# Patient Record
Sex: FEMALE | Race: OTHER | HISPANIC OR LATINO
[De-identification: names, ages, dates, MRNs, and addresses within clinical notes are randomized per-mention and may not be internally consistent; named-entity substitution may affect disease eponyms.]

---

## 2018-12-06 ENCOUNTER — APPOINTMENT (OUTPATIENT)
Dept: OBGYN | Facility: CLINIC | Age: 28
End: 2018-12-06
Payer: COMMERCIAL

## 2018-12-06 ENCOUNTER — ASOB RESULT (OUTPATIENT)
Age: 28
End: 2018-12-06

## 2018-12-06 PROCEDURE — 76830 TRANSVAGINAL US NON-OB: CPT

## 2019-10-02 ENCOUNTER — APPOINTMENT (OUTPATIENT)
Dept: HUMAN REPRODUCTION | Facility: CLINIC | Age: 29
End: 2019-10-02
Payer: COMMERCIAL

## 2019-10-02 PROCEDURE — 76830 TRANSVAGINAL US NON-OB: CPT

## 2019-10-02 PROCEDURE — 99205 OFFICE O/P NEW HI 60 MIN: CPT | Mod: 25

## 2019-10-09 ENCOUNTER — APPOINTMENT (OUTPATIENT)
Dept: HUMAN REPRODUCTION | Facility: CLINIC | Age: 29
End: 2019-10-09
Payer: COMMERCIAL

## 2019-10-09 PROCEDURE — 99213 OFFICE O/P EST LOW 20 MIN: CPT | Mod: 25

## 2019-10-09 PROCEDURE — 76830 TRANSVAGINAL US NON-OB: CPT

## 2019-10-16 ENCOUNTER — APPOINTMENT (OUTPATIENT)
Dept: HUMAN REPRODUCTION | Facility: CLINIC | Age: 29
End: 2019-10-16
Payer: COMMERCIAL

## 2019-10-16 PROCEDURE — 99213 OFFICE O/P EST LOW 20 MIN: CPT | Mod: 25

## 2019-10-16 PROCEDURE — 76830 TRANSVAGINAL US NON-OB: CPT

## 2019-10-24 ENCOUNTER — APPOINTMENT (OUTPATIENT)
Dept: HUMAN REPRODUCTION | Facility: CLINIC | Age: 29
End: 2019-10-24
Payer: COMMERCIAL

## 2019-10-24 PROCEDURE — 76830 TRANSVAGINAL US NON-OB: CPT

## 2019-10-24 PROCEDURE — 99213 OFFICE O/P EST LOW 20 MIN: CPT | Mod: 25

## 2019-10-24 PROCEDURE — 36415 COLL VENOUS BLD VENIPUNCTURE: CPT

## 2019-11-12 ENCOUNTER — APPOINTMENT (OUTPATIENT)
Dept: HUMAN REPRODUCTION | Facility: CLINIC | Age: 29
End: 2019-11-12
Payer: COMMERCIAL

## 2019-11-12 PROCEDURE — 99214 OFFICE O/P EST MOD 30 MIN: CPT

## 2019-11-20 ENCOUNTER — APPOINTMENT (OUTPATIENT)
Dept: HUMAN REPRODUCTION | Facility: CLINIC | Age: 29
End: 2019-11-20
Payer: COMMERCIAL

## 2019-11-20 PROCEDURE — 99213 OFFICE O/P EST LOW 20 MIN: CPT

## 2019-11-20 PROCEDURE — 76830 TRANSVAGINAL US NON-OB: CPT

## 2019-12-17 ENCOUNTER — APPOINTMENT (OUTPATIENT)
Dept: HUMAN REPRODUCTION | Facility: CLINIC | Age: 29
End: 2019-12-17

## 2020-07-16 ENCOUNTER — RESULT REVIEW (OUTPATIENT)
Age: 30
End: 2020-07-16

## 2020-11-12 ENCOUNTER — ASOB RESULT (OUTPATIENT)
Age: 30
End: 2020-11-12

## 2020-11-12 ENCOUNTER — APPOINTMENT (OUTPATIENT)
Dept: OBGYN | Facility: CLINIC | Age: 30
End: 2020-11-12
Payer: COMMERCIAL

## 2020-11-12 PROCEDURE — 76817 TRANSVAGINAL US OBSTETRIC: CPT

## 2020-11-16 ENCOUNTER — APPOINTMENT (OUTPATIENT)
Dept: DISASTER EMERGENCY | Facility: CLINIC | Age: 30
End: 2020-11-16

## 2020-11-17 ENCOUNTER — OUTPATIENT (OUTPATIENT)
Dept: OUTPATIENT SERVICES | Facility: HOSPITAL | Age: 30
LOS: 1 days | End: 2020-11-17

## 2020-11-17 VITALS
OXYGEN SATURATION: 98 % | SYSTOLIC BLOOD PRESSURE: 100 MMHG | HEART RATE: 92 BPM | RESPIRATION RATE: 16 BRPM | WEIGHT: 147.93 LBS | DIASTOLIC BLOOD PRESSURE: 60 MMHG | TEMPERATURE: 97 F | HEIGHT: 60 IN

## 2020-11-17 DIAGNOSIS — Z98.891 HISTORY OF UTERINE SCAR FROM PREVIOUS SURGERY: Chronic | ICD-10-CM

## 2020-11-17 DIAGNOSIS — O02.1 MISSED ABORTION: ICD-10-CM

## 2020-11-17 DIAGNOSIS — Z88.0 ALLERGY STATUS TO PENICILLIN: ICD-10-CM

## 2020-11-17 DIAGNOSIS — Z98.890 OTHER SPECIFIED POSTPROCEDURAL STATES: Chronic | ICD-10-CM

## 2020-11-17 LAB
BLD GP AB SCN SERPL QL: NEGATIVE — SIGNIFICANT CHANGE UP
HCT VFR BLD CALC: 35.7 % — SIGNIFICANT CHANGE UP (ref 34.5–45)
HGB BLD-MCNC: 11.9 G/DL — SIGNIFICANT CHANGE UP (ref 11.5–15.5)
MCHC RBC-ENTMCNC: 30.7 PG — SIGNIFICANT CHANGE UP (ref 27–34)
MCHC RBC-ENTMCNC: 33.3 % — SIGNIFICANT CHANGE UP (ref 32–36)
MCV RBC AUTO: 92 FL — SIGNIFICANT CHANGE UP (ref 80–100)
NRBC # FLD: 0 K/UL — SIGNIFICANT CHANGE UP (ref 0–0)
PLATELET # BLD AUTO: 402 K/UL — HIGH (ref 150–400)
PMV BLD: 10.1 FL — SIGNIFICANT CHANGE UP (ref 7–13)
RBC # BLD: 3.88 M/UL — SIGNIFICANT CHANGE UP (ref 3.8–5.2)
RBC # FLD: 12.2 % — SIGNIFICANT CHANGE UP (ref 10.3–14.5)
RH IG SCN BLD-IMP: POSITIVE — SIGNIFICANT CHANGE UP
SARS-COV-2 N GENE NPH QL NAA+PROBE: NOT DETECTED
WBC # BLD: 12.88 K/UL — HIGH (ref 3.8–10.5)
WBC # FLD AUTO: 12.88 K/UL — HIGH (ref 3.8–10.5)

## 2020-11-17 NOTE — H&P PST ADULT - NSICDXPROBLEM_GEN_ALL_CORE_FT
PROBLEM DIAGNOSES  Problem: Missed   Assessment and Plan: scheduled for DVC on 20  pending labs   preop instructions given & explained, pt verbalized understanding  COVID test scheduled by surgeon's office    Problem: Penicillin allergy  Assessment and Plan: OR booking notified via fax

## 2020-11-17 NOTE — H&P PST ADULT - NEGATIVE ENMT SYMPTOMS
no vertigo/no sinus symptoms/no tinnitus/no post-nasal discharge/no nose bleeds/no ear pain/no hearing difficulty

## 2020-11-17 NOTE — H&P PST ADULT - HISTORY OF PRESENT ILLNESS
29 y/o female 6 weeks IUP, presents for preop evaluation for DVC for Missed AB.   Pt stated she went for fetal heart monitoring & no FHR heard.

## 2020-11-17 NOTE — H&P PST ADULT - NSICDXPASTMEDICALHX_GEN_ALL_CORE_FT
PAST MEDICAL HISTORY:  Anemia B Twelve Deficiency     h/o Gall Bladder Disease     Menstrual Disorder      PAST MEDICAL HISTORY:  Anemia B Twelve Deficiency     h/o Gall Bladder Disease     HELLP syndrome 2016    Menstrual Disorder

## 2020-11-17 NOTE — H&P PST ADULT - NEGATIVE CARDIOVASCULAR SYMPTOMS
no palpitations/no orthopnea/no claudication/no peripheral edema/no dyspnea on exertion/no chest pain

## 2020-11-17 NOTE — H&P PST ADULT - NSICDXPASTSURGICALHX_GEN_ALL_CORE_FT
PAST SURGICAL HISTORY:  h/o cholecystectomy  laproscopic silvia    History of  2016    History of D&C 2009

## 2020-11-17 NOTE — H&P PST ADULT - VENOUS THROMBOEMBOLISM AGE
[facility] Emergency Department  [address] [place] [address]  Phone:  [phone]  Fax:  [phone]                                    Isaura Gray   MRN: [id number]    Department:  [facility] Emergency Department   Date of Visit:  3/12/2019           After Visit Summary Signature Page    I have received my discharge instructions, and my questions have been answered. I have discussed any challenges I see with this plan with the nurse or doctor.    ..........................................................................................................................................  Patient/Patient Representative Signature      ..........................................................................................................................................  Patient Representative Print Name and Relationship to Patient    ..................................................               ................................................  Date                                   Time    ..........................................................................................................................................  Reviewed by Signature/Title    ...................................................              ..............................................  Date                                               Time          22EPIC Rev 08/18       
(0) age 40 years or less

## 2020-11-17 NOTE — H&P PST ADULT - NEGATIVE GENERAL GENITOURINARY SYMPTOMS
no hematuria/no flank pain R/no bladder infections/no urinary hesitancy/no renal colic/normal urinary frequency/no flank pain L

## 2020-11-18 ENCOUNTER — TRANSCRIPTION ENCOUNTER (OUTPATIENT)
Age: 30
End: 2020-11-18

## 2020-11-18 NOTE — ASU PATIENT PROFILE, ADULT - PMH
Anemia B Twelve Deficiency    h/o Gall Bladder Disease    HELLP syndrome  2016  Menstrual Disorder

## 2020-11-19 ENCOUNTER — RESULT REVIEW (OUTPATIENT)
Age: 30
End: 2020-11-19

## 2020-11-19 ENCOUNTER — OUTPATIENT (OUTPATIENT)
Dept: OUTPATIENT SERVICES | Facility: HOSPITAL | Age: 30
LOS: 1 days | Discharge: HOME | End: 2020-11-19
Payer: COMMERCIAL

## 2020-11-19 ENCOUNTER — OUTPATIENT (OUTPATIENT)
Dept: OUTPATIENT SERVICES | Facility: HOSPITAL | Age: 30
LOS: 1 days | End: 2020-11-19
Payer: COMMERCIAL

## 2020-11-19 VITALS
OXYGEN SATURATION: 99 % | RESPIRATION RATE: 18 BRPM | SYSTOLIC BLOOD PRESSURE: 106 MMHG | HEART RATE: 88 BPM | DIASTOLIC BLOOD PRESSURE: 66 MMHG

## 2020-11-19 VITALS
HEART RATE: 91 BPM | RESPIRATION RATE: 16 BRPM | SYSTOLIC BLOOD PRESSURE: 118 MMHG | TEMPERATURE: 99 F | DIASTOLIC BLOOD PRESSURE: 80 MMHG | OXYGEN SATURATION: 100 %

## 2020-11-19 DIAGNOSIS — Z98.890 OTHER SPECIFIED POSTPROCEDURAL STATES: Chronic | ICD-10-CM

## 2020-11-19 DIAGNOSIS — O02.1 MISSED ABORTION: ICD-10-CM

## 2020-11-19 DIAGNOSIS — Z98.891 HISTORY OF UTERINE SCAR FROM PREVIOUS SURGERY: Chronic | ICD-10-CM

## 2020-11-19 PROBLEM — O14.20 HELLP SYNDROME (HELLP), UNSPECIFIED TRIMESTER: Chronic | Status: ACTIVE | Noted: 2020-11-17

## 2020-11-19 PROCEDURE — 88305 TISSUE EXAM BY PATHOLOGIST: CPT | Mod: 26

## 2020-11-19 PROCEDURE — 88233 TISSUE CULTURE SKIN/BIOPSY: CPT

## 2020-11-19 PROCEDURE — 88280 CHROMOSOME KARYOTYPE STUDY: CPT

## 2020-11-19 PROCEDURE — 81229 CYTOG ALYS CHRML ABNR SNPCGH: CPT

## 2020-11-19 PROCEDURE — 88264 CHROMOSOME ANALYSIS 20-25: CPT

## 2020-11-19 RX ORDER — SODIUM CHLORIDE 9 MG/ML
1000 INJECTION, SOLUTION INTRAVENOUS
Refills: 0 | Status: DISCONTINUED | OUTPATIENT
Start: 2020-11-19 | End: 2020-12-03

## 2020-11-19 NOTE — ASU DISCHARGE PLAN (ADULT/PEDIATRIC) - ASU DC SPECIAL INSTRUCTIONSFT
Please make an appointment to see your doctor as ordered.   Nothing per vagina - no douching, no tampons, no sex - for 2 weeks.   Please call your doctor if you develop: fevers, nausea or vomiting, or have pain not relieved by motrin and/or tylenol.

## 2020-11-19 NOTE — ASU DISCHARGE PLAN (ADULT/PEDIATRIC) - CARE PROVIDER_API CALL
Chilango Dove)  Obstetrics and Gynecology  [address] [id number]  Phone: [phone]  Fax: [phone]  Follow Up Time:

## 2020-11-19 NOTE — ASU DISCHARGE PLAN (ADULT/PEDIATRIC) - ACTIVITY LEVEL
No tampons/No tub baths/Nothing per rectum/Elevate extremity/Nothing per vagina/No douching/No intercourse

## 2020-11-24 LAB — SURGICAL PATHOLOGY STUDY: SIGNIFICANT CHANGE UP

## 2020-12-03 LAB — CHROM ANALY OVERALL INTERP SPEC-IMP: SIGNIFICANT CHANGE UP

## 2021-02-12 LAB — MICROARRAY DELETION: SIGNIFICANT CHANGE UP

## 2021-04-26 ENCOUNTER — ASOB RESULT (OUTPATIENT)
Age: 31
End: 2021-04-26

## 2021-04-26 ENCOUNTER — APPOINTMENT (OUTPATIENT)
Dept: OBGYN | Facility: CLINIC | Age: 31
End: 2021-04-26
Payer: COMMERCIAL

## 2021-04-26 ENCOUNTER — APPOINTMENT (OUTPATIENT)
Dept: OBGYN | Facility: CLINIC | Age: 31
End: 2021-04-26

## 2021-04-26 PROCEDURE — 99072 ADDL SUPL MATRL&STAF TM PHE: CPT

## 2021-04-26 PROCEDURE — 76817 TRANSVAGINAL US OBSTETRIC: CPT

## 2021-04-30 NOTE — H&P PST ADULT - NEGATIVE NEUROLOGICAL SYMPTOMS
no focal seizures/no vertigo/no transient paralysis/no weakness/no paresthesias/no generalized seizures/no headache/no syncope/no tremors 108.5

## 2021-05-11 ENCOUNTER — EMERGENCY (EMERGENCY)
Facility: HOSPITAL | Age: 31
LOS: 1 days | Discharge: HOME | End: 2021-05-11
Attending: EMERGENCY MEDICINE | Admitting: EMERGENCY MEDICINE
Payer: COMMERCIAL

## 2021-05-11 VITALS
HEIGHT: 60 IN | SYSTOLIC BLOOD PRESSURE: 121 MMHG | TEMPERATURE: 98 F | RESPIRATION RATE: 16 BRPM | DIASTOLIC BLOOD PRESSURE: 69 MMHG | OXYGEN SATURATION: 100 % | HEART RATE: 89 BPM

## 2021-05-11 DIAGNOSIS — Z98.891 HISTORY OF UTERINE SCAR FROM PREVIOUS SURGERY: Chronic | ICD-10-CM

## 2021-05-11 DIAGNOSIS — Z98.890 OTHER SPECIFIED POSTPROCEDURAL STATES: Chronic | ICD-10-CM

## 2021-05-11 LAB
ALBUMIN SERPL ELPH-MCNC: 4.1 G/DL — SIGNIFICANT CHANGE UP (ref 3.3–5)
ALP SERPL-CCNC: 84 U/L — SIGNIFICANT CHANGE UP (ref 40–120)
ALT FLD-CCNC: 18 U/L — SIGNIFICANT CHANGE UP (ref 4–33)
ANION GAP SERPL CALC-SCNC: 13 MMOL/L — SIGNIFICANT CHANGE UP (ref 7–14)
APPEARANCE UR: CLEAR — SIGNIFICANT CHANGE UP
AST SERPL-CCNC: 17 U/L — SIGNIFICANT CHANGE UP (ref 4–32)
BACTERIA # UR AUTO: ABNORMAL
BASOPHILS # BLD AUTO: 0.06 K/UL — SIGNIFICANT CHANGE UP (ref 0–0.2)
BASOPHILS NFR BLD AUTO: 0.5 % — SIGNIFICANT CHANGE UP (ref 0–2)
BILIRUB SERPL-MCNC: 0.3 MG/DL — SIGNIFICANT CHANGE UP (ref 0.2–1.2)
BILIRUB UR-MCNC: NEGATIVE — SIGNIFICANT CHANGE UP
BLD GP AB SCN SERPL QL: NEGATIVE — SIGNIFICANT CHANGE UP
BUN SERPL-MCNC: 9 MG/DL — SIGNIFICANT CHANGE UP (ref 7–23)
CALCIUM SERPL-MCNC: 9.2 MG/DL — SIGNIFICANT CHANGE UP (ref 8.4–10.5)
CHLORIDE SERPL-SCNC: 101 MMOL/L — SIGNIFICANT CHANGE UP (ref 98–107)
CO2 SERPL-SCNC: 20 MMOL/L — LOW (ref 22–31)
COLOR SPEC: YELLOW — SIGNIFICANT CHANGE UP
CREAT SERPL-MCNC: 0.53 MG/DL — SIGNIFICANT CHANGE UP (ref 0.5–1.3)
DIFF PNL FLD: ABNORMAL
EOSINOPHIL # BLD AUTO: 0.08 K/UL — SIGNIFICANT CHANGE UP (ref 0–0.5)
EOSINOPHIL NFR BLD AUTO: 0.7 % — SIGNIFICANT CHANGE UP (ref 0–6)
EPI CELLS # UR: 2 /HPF — SIGNIFICANT CHANGE UP (ref 0–5)
GLUCOSE SERPL-MCNC: 83 MG/DL — SIGNIFICANT CHANGE UP (ref 70–99)
GLUCOSE UR QL: NEGATIVE — SIGNIFICANT CHANGE UP
HCG SERPL-ACNC: SIGNIFICANT CHANGE UP MIU/ML
HCT VFR BLD CALC: 36.8 % — SIGNIFICANT CHANGE UP (ref 34.5–45)
HGB BLD-MCNC: 12.2 G/DL — SIGNIFICANT CHANGE UP (ref 11.5–15.5)
IANC: 7.89 K/UL — SIGNIFICANT CHANGE UP (ref 1.5–8.5)
IMM GRANULOCYTES NFR BLD AUTO: 0.7 % — SIGNIFICANT CHANGE UP (ref 0–1.5)
KETONES UR-MCNC: NEGATIVE — SIGNIFICANT CHANGE UP
LEUKOCYTE ESTERASE UR-ACNC: NEGATIVE — SIGNIFICANT CHANGE UP
LYMPHOCYTES # BLD AUTO: 2.84 K/UL — SIGNIFICANT CHANGE UP (ref 1–3.3)
LYMPHOCYTES # BLD AUTO: 24.7 % — SIGNIFICANT CHANGE UP (ref 13–44)
MCHC RBC-ENTMCNC: 30 PG — SIGNIFICANT CHANGE UP (ref 27–34)
MCHC RBC-ENTMCNC: 33.2 GM/DL — SIGNIFICANT CHANGE UP (ref 32–36)
MCV RBC AUTO: 90.4 FL — SIGNIFICANT CHANGE UP (ref 80–100)
MONOCYTES # BLD AUTO: 0.55 K/UL — SIGNIFICANT CHANGE UP (ref 0–0.9)
MONOCYTES NFR BLD AUTO: 4.8 % — SIGNIFICANT CHANGE UP (ref 2–14)
NEUTROPHILS # BLD AUTO: 7.89 K/UL — HIGH (ref 1.8–7.4)
NEUTROPHILS NFR BLD AUTO: 68.6 % — SIGNIFICANT CHANGE UP (ref 43–77)
NITRITE UR-MCNC: NEGATIVE — SIGNIFICANT CHANGE UP
NRBC # BLD: 0 /100 WBCS — SIGNIFICANT CHANGE UP
NRBC # FLD: 0 K/UL — SIGNIFICANT CHANGE UP
PH UR: 7 — SIGNIFICANT CHANGE UP (ref 5–8)
PLATELET # BLD AUTO: 384 K/UL — SIGNIFICANT CHANGE UP (ref 150–400)
POTASSIUM SERPL-MCNC: 4.1 MMOL/L — SIGNIFICANT CHANGE UP (ref 3.5–5.3)
POTASSIUM SERPL-SCNC: 4.1 MMOL/L — SIGNIFICANT CHANGE UP (ref 3.5–5.3)
PROT SERPL-MCNC: 7.8 G/DL — SIGNIFICANT CHANGE UP (ref 6–8.3)
PROT UR-MCNC: ABNORMAL
RBC # BLD: 4.07 M/UL — SIGNIFICANT CHANGE UP (ref 3.8–5.2)
RBC # FLD: 12.3 % — SIGNIFICANT CHANGE UP (ref 10.3–14.5)
RBC CASTS # UR COMP ASSIST: 1 /HPF — SIGNIFICANT CHANGE UP (ref 0–4)
RH IG SCN BLD-IMP: POSITIVE — SIGNIFICANT CHANGE UP
SODIUM SERPL-SCNC: 134 MMOL/L — LOW (ref 135–145)
SP GR SPEC: 1.02 — SIGNIFICANT CHANGE UP (ref 1.01–1.02)
UROBILINOGEN FLD QL: SIGNIFICANT CHANGE UP
WBC # BLD: 11.5 K/UL — HIGH (ref 3.8–10.5)
WBC # FLD AUTO: 11.5 K/UL — HIGH (ref 3.8–10.5)
WBC UR QL: 2 /HPF — SIGNIFICANT CHANGE UP (ref 0–5)

## 2021-05-11 PROCEDURE — 76801 OB US < 14 WKS SINGLE FETUS: CPT | Mod: 26

## 2021-05-11 PROCEDURE — 99285 EMERGENCY DEPT VISIT HI MDM: CPT

## 2021-05-11 RX ORDER — SODIUM CHLORIDE 9 MG/ML
1000 INJECTION INTRAMUSCULAR; INTRAVENOUS; SUBCUTANEOUS ONCE
Refills: 0 | Status: COMPLETED | OUTPATIENT
Start: 2021-05-11 | End: 2021-05-11

## 2021-05-11 RX ORDER — ONDANSETRON 8 MG/1
1 TABLET, FILM COATED ORAL
Qty: 30 | Refills: 0
Start: 2021-05-11 | End: 2021-05-20

## 2021-05-11 RX ADMIN — SODIUM CHLORIDE 1000 MILLILITER(S): 9 INJECTION INTRAMUSCULAR; INTRAVENOUS; SUBCUTANEOUS at 09:31

## 2021-05-11 NOTE — ED ADULT TRIAGE NOTE - CHIEF COMPLAINT QUOTE
pt , 7 weeks pregnant c/o intermittent vaginal bleeding x 5 days, with worsening yesterday. pt endorses L sided pelvic cramping, fatigue, nausea. pt denies dysuria, fever, chest pain, son. Hx of HELLP Syndrome in 2016.

## 2021-05-11 NOTE — ED PROVIDER NOTE - ATTENDING CONTRIBUTION TO CARE
31 yo F , currently 7 weeks pregnancy, confirmed IUP last Thursday w/ OB/GYN Dr. Dove, with PMH of HELLP syndrome 2016, missed  s/p D&C 2020, fibroid presents to ER c/o vaginal bleeding since last Friday a/w left pelvic pain. Will obtain labs, TVUS to assess pregnancy, reassess. Abd s/nt, NAD

## 2021-05-11 NOTE — ED PROVIDER NOTE - PATIENT PORTAL LINK FT
You can access the FollowMyHealth Patient Portal offered by [facility] by registering at the following website: [email or web address]. By joining [employer]’s FollowMyHealth portal, you will also be able to view your health information using other applications (apps) compatible with our system. You can access the FollowMyHealth Patient Portal offered by [facility] by registering at the following website: [email or web address]. By joining [employer]’s FollowMyHealth portal, you will also be able to view your health information using other applications (apps) compatible with our system.

## 2021-05-11 NOTE — ED PROVIDER NOTE - ABDOMINAL EXAM
mild left suprapubic discomfort, no CVAT b/l, no guarding, no rebound/soft/nondistended/no pulsating masses

## 2021-05-11 NOTE — ED PROVIDER NOTE - PROGRESS NOTE DETAILS
PA ELSA: Patient reassessed, sitting comfortably in chair in NAD, denies any complaints. States feeling better, symptoms improved. labs reviewed. TVUS shows single live IUP, small subchorionic hematoma. Discussed with attending, patient can be discharged home to f/u with GYN. The patient was given verbal and written discharge instructions. Specifically, instructions when to return to the ED and when to seek follow-up from their pcp was discussed. Any specialty follow-up was discussed, including how to make an appointment.  Instructions were discussed in simple, plain language and was understood by the patient. The patient understands that should their symptoms worsen or any new symptoms arise, they should return to the ED immediately for further evaluation. All pt's questions were answered. Patient verbalizes understanding.

## 2021-05-11 NOTE — ED PROVIDER NOTE - OBJECTIVE STATEMENT
31 yo F , currently 7 weeks pregnancy, confirmed IUP last Thursday w/ GYN, presents to ER c/o vaginal bleeding since last Friday a/w left pelvic pain. 29 yo F , currently 7 weeks pregnancy, confirmed IUP last Thursday w/ OB/GYN Dr. Dove, with PMH of HELLP syndrome 2016, missed  s/p D&C 2020, fibroid presents to ER c/o vaginal bleeding since last Friday a/w left pelvic pain. Pt states she was having brown vaginal spotting started on Friday and bright red vaginal spotting yesterday a/w constant left pelvic pain. Reports seen by GYN last Friday, started on Aspirin and progesterone. Admits nausea, nonbloody diarrhea since Friday. Admits urinary frequency since pregnant, no burning on urination. Denies any fever, chills, vomiting, headache, dizziness, chest pain, sob, back pain, weakness, numbness, leg swelling, recent travel, or any other complaints.

## 2021-05-11 NOTE — ED PROVIDER NOTE - CLINICAL SUMMARY MEDICAL DECISION MAKING FREE TEXT BOX
31 yo F , currently 7 weeks pregnancy, confirmed IUP last Thursday w/ GYN, presents to ER c/o vaginal bleeding since last Friday a/w left pelvic pain. DDX: miscarriage, threatened . Plan: labs, Ua, TVUS to eval pregnancy, pain control w/ warm compress, and reassess. 31 yo F , currently 7 weeks pregnancy, confirmed IUP last Thursday w/ GYN, presents to ER c/o vaginal bleeding since last Friday a/w left pelvic pain. DDX: miscarriage, threatened , subchorionic hematoma. Plan: labs, Ua, TVUS to eval pregnancy, pain control w/ warm compress, pt declined pain med and reassess.

## 2021-05-11 NOTE — ED PROVIDER NOTE - NSFOLLOWUPINSTRUCTIONS_ED_ALL_ED_FT
Follow up with your PMD within 48-72 hrs. Follow up with your OBGYN within 1-2 days or call our clinic  [phone]. Rest, increase your fluids. No heavy lifting. Refrain from sexual intercourse.  Worsening pain, vaginal bleeding, vomiting, dizziness, weakness or ANY NEW CONCERNING SYMPTOMS return to the emergency department. Follow up with your PMD within 48-72 hrs. Follow up with your OBGYN within 1-2 days or call our clinic  [phone]. Take Zofran 4 mg ODT every 8 hours as needed for nausea and vomiting.  Rest, increase your fluids. No heavy lifting. Refrain from sexual intercourse.  Worsening pain, vaginal bleeding, vomiting, dizziness, weakness or ANY NEW CONCERNING SYMPTOMS return to the emergency department.

## 2021-05-12 LAB
CULTURE RESULTS: SIGNIFICANT CHANGE UP
SPECIMEN SOURCE: SIGNIFICANT CHANGE UP

## 2021-06-10 ENCOUNTER — APPOINTMENT (OUTPATIENT)
Dept: ANTEPARTUM | Facility: CLINIC | Age: 31
End: 2021-06-10
Payer: COMMERCIAL

## 2021-06-10 PROCEDURE — 76813 OB US NUCHAL MEAS 1 GEST: CPT

## 2021-06-12 ENCOUNTER — EMERGENCY (EMERGENCY)
Facility: HOSPITAL | Age: 31
LOS: 1 days | Discharge: HOME | End: 2021-06-12
Attending: EMERGENCY MEDICINE | Admitting: EMERGENCY MEDICINE
Payer: COMMERCIAL

## 2021-06-12 VITALS
HEIGHT: 60 IN | OXYGEN SATURATION: 100 % | SYSTOLIC BLOOD PRESSURE: 133 MMHG | TEMPERATURE: 98 F | HEART RATE: 83 BPM | DIASTOLIC BLOOD PRESSURE: 92 MMHG | RESPIRATION RATE: 15 BRPM

## 2021-06-12 DIAGNOSIS — Z98.891 HISTORY OF UTERINE SCAR FROM PREVIOUS SURGERY: Chronic | ICD-10-CM

## 2021-06-12 DIAGNOSIS — Z98.890 OTHER SPECIFIED POSTPROCEDURAL STATES: Chronic | ICD-10-CM

## 2021-06-12 LAB
ALBUMIN SERPL ELPH-MCNC: 3.7 G/DL — SIGNIFICANT CHANGE UP (ref 3.3–5)
ALP SERPL-CCNC: 78 U/L — SIGNIFICANT CHANGE UP (ref 40–120)
ALT FLD-CCNC: 11 U/L — SIGNIFICANT CHANGE UP (ref 4–33)
ANION GAP SERPL CALC-SCNC: 13 MMOL/L — SIGNIFICANT CHANGE UP (ref 7–14)
APPEARANCE UR: CLEAR — SIGNIFICANT CHANGE UP
AST SERPL-CCNC: 10 U/L — SIGNIFICANT CHANGE UP (ref 4–32)
BACTERIA # UR AUTO: NEGATIVE — SIGNIFICANT CHANGE UP
BASOPHILS # BLD AUTO: 0.05 K/UL — SIGNIFICANT CHANGE UP (ref 0–0.2)
BASOPHILS NFR BLD AUTO: 0.3 % — SIGNIFICANT CHANGE UP (ref 0–2)
BILIRUB SERPL-MCNC: <0.2 MG/DL — SIGNIFICANT CHANGE UP (ref 0.2–1.2)
BILIRUB UR-MCNC: NEGATIVE — SIGNIFICANT CHANGE UP
BLD GP AB SCN SERPL QL: NEGATIVE — SIGNIFICANT CHANGE UP
BUN SERPL-MCNC: 8 MG/DL — SIGNIFICANT CHANGE UP (ref 7–23)
CALCIUM SERPL-MCNC: 9.2 MG/DL — SIGNIFICANT CHANGE UP (ref 8.4–10.5)
CHLORIDE SERPL-SCNC: 100 MMOL/L — SIGNIFICANT CHANGE UP (ref 98–107)
CO2 SERPL-SCNC: 21 MMOL/L — LOW (ref 22–31)
COLOR SPEC: COLORLESS — SIGNIFICANT CHANGE UP
CREAT SERPL-MCNC: 0.47 MG/DL — LOW (ref 0.5–1.3)
DIFF PNL FLD: ABNORMAL
EOSINOPHIL # BLD AUTO: 0.15 K/UL — SIGNIFICANT CHANGE UP (ref 0–0.5)
EOSINOPHIL NFR BLD AUTO: 1 % — SIGNIFICANT CHANGE UP (ref 0–6)
EPI CELLS # UR: 3 /HPF — SIGNIFICANT CHANGE UP (ref 0–5)
GLUCOSE SERPL-MCNC: 93 MG/DL — SIGNIFICANT CHANGE UP (ref 70–99)
GLUCOSE UR QL: NEGATIVE — SIGNIFICANT CHANGE UP
HCG SERPL-ACNC: SIGNIFICANT CHANGE UP MIU/ML
HCT VFR BLD CALC: 34.4 % — LOW (ref 34.5–45)
HGB BLD-MCNC: 11.6 G/DL — SIGNIFICANT CHANGE UP (ref 11.5–15.5)
HYALINE CASTS # UR AUTO: 2 /LPF — SIGNIFICANT CHANGE UP (ref 0–7)
IANC: 10.28 K/UL — HIGH (ref 1.5–8.5)
IMM GRANULOCYTES NFR BLD AUTO: 0.5 % — SIGNIFICANT CHANGE UP (ref 0–1.5)
KETONES UR-MCNC: NEGATIVE — SIGNIFICANT CHANGE UP
LEUKOCYTE ESTERASE UR-ACNC: NEGATIVE — SIGNIFICANT CHANGE UP
LYMPHOCYTES # BLD AUTO: 24.7 % — SIGNIFICANT CHANGE UP (ref 13–44)
LYMPHOCYTES # BLD AUTO: 3.75 K/UL — HIGH (ref 1–3.3)
MCHC RBC-ENTMCNC: 30.2 PG — SIGNIFICANT CHANGE UP (ref 27–34)
MCHC RBC-ENTMCNC: 33.7 GM/DL — SIGNIFICANT CHANGE UP (ref 32–36)
MCV RBC AUTO: 89.6 FL — SIGNIFICANT CHANGE UP (ref 80–100)
MONOCYTES # BLD AUTO: 0.85 K/UL — SIGNIFICANT CHANGE UP (ref 0–0.9)
MONOCYTES NFR BLD AUTO: 5.6 % — SIGNIFICANT CHANGE UP (ref 2–14)
NEUTROPHILS # BLD AUTO: 10.28 K/UL — HIGH (ref 1.8–7.4)
NEUTROPHILS NFR BLD AUTO: 67.9 % — SIGNIFICANT CHANGE UP (ref 43–77)
NITRITE UR-MCNC: NEGATIVE — SIGNIFICANT CHANGE UP
NRBC # BLD: 0 /100 WBCS — SIGNIFICANT CHANGE UP
NRBC # FLD: 0 K/UL — SIGNIFICANT CHANGE UP
PH UR: 7 — SIGNIFICANT CHANGE UP (ref 5–8)
PLATELET # BLD AUTO: 386 K/UL — SIGNIFICANT CHANGE UP (ref 150–400)
POTASSIUM SERPL-MCNC: 4 MMOL/L — SIGNIFICANT CHANGE UP (ref 3.5–5.3)
POTASSIUM SERPL-SCNC: 4 MMOL/L — SIGNIFICANT CHANGE UP (ref 3.5–5.3)
PROT SERPL-MCNC: 7.3 G/DL — SIGNIFICANT CHANGE UP (ref 6–8.3)
PROT UR-MCNC: NEGATIVE — SIGNIFICANT CHANGE UP
RBC # BLD: 3.84 M/UL — SIGNIFICANT CHANGE UP (ref 3.8–5.2)
RBC # FLD: 12.2 % — SIGNIFICANT CHANGE UP (ref 10.3–14.5)
RBC CASTS # UR COMP ASSIST: 1 /HPF — SIGNIFICANT CHANGE UP (ref 0–4)
RH IG SCN BLD-IMP: POSITIVE — SIGNIFICANT CHANGE UP
SODIUM SERPL-SCNC: 134 MMOL/L — LOW (ref 135–145)
SP GR SPEC: 1.01 — LOW (ref 1.01–1.02)
UROBILINOGEN FLD QL: SIGNIFICANT CHANGE UP
WBC # BLD: 15.16 K/UL — HIGH (ref 3.8–10.5)
WBC # FLD AUTO: 15.16 K/UL — HIGH (ref 3.8–10.5)
WBC UR QL: 1 /HPF — SIGNIFICANT CHANGE UP (ref 0–5)

## 2021-06-12 PROCEDURE — 99284 EMERGENCY DEPT VISIT MOD MDM: CPT

## 2021-06-12 RX ORDER — HYDROXYZINE HCL 10 MG
25 TABLET ORAL ONCE
Refills: 0 | Status: COMPLETED | OUTPATIENT
Start: 2021-06-12 | End: 2021-06-12

## 2021-06-12 RX ORDER — CEFTRIAXONE 500 MG/1
1000 INJECTION, POWDER, FOR SOLUTION INTRAMUSCULAR; INTRAVENOUS ONCE
Refills: 0 | Status: COMPLETED | OUTPATIENT
Start: 2021-06-12 | End: 2021-06-12

## 2021-06-12 RX ADMIN — CEFTRIAXONE 100 MILLIGRAM(S): 500 INJECTION, POWDER, FOR SOLUTION INTRAMUSCULAR; INTRAVENOUS at 22:30

## 2021-06-12 RX ADMIN — Medication 25 MILLIGRAM(S): at 22:51

## 2021-06-12 NOTE — ED ADULT NURSE NOTE - OBJECTIVE STATEMENT
A&oX4 ambulatory self care 12 week pregnant female presenting for abdominal pain and vag bleeding x1 day. has a hx of HELLP syndrome in previous pregnancy. patient in no active distress. 20g iv placed in left ac. labs and urine drawn and sent

## 2021-06-12 NOTE — ED PROVIDER NOTE - ATTENDING CONTRIBUTION TO CARE
I performed a face-to-face evaluation of the patient and performed a history and physical examination. I agree with the history and physical examination.    , 12 wks pregnant, retroplacental hematoma found on US done 2/2 cramping abd pain. Pain worsening, today had VB. Concern for miscarriage or expanding hematoma. Check TVUS. Has dysuria (check UA, UCx).

## 2021-06-12 NOTE — ED PROVIDER NOTE - CLINICAL SUMMARY MEDICAL DECISION MAKING FREE TEXT BOX
Lance: , 12 wks pregnant, retroplacental hematoma found on US done 2/2 cramping abd pain. Pain worsening, today had VB. Concern for miscarriage or expanding hematoma. Check TVUS. Has dysuria (check UA, UCx).

## 2021-06-12 NOTE — ED ADULT TRIAGE NOTE - CHIEF COMPLAINT QUOTE
alert no acute distress 12 weeks pregnant c/o  abd cramping  and vaginal bleeding x 1  no c/o dizziness CP or SOB    hx hellp syndrome c section lap silvia  takes asa

## 2021-06-12 NOTE — ED PROVIDER NOTE - PATIENT PORTAL LINK FT
You can access the FollowMyHealth Patient Portal offered by [facility] by registering at the following website: [email or web address]. By joining [email or web address]’s FollowMyHealth portal, you will also be able to view your health information using other applications (apps) compatible with our system.

## 2021-06-12 NOTE — ED PROVIDER NOTE - PROGRESS NOTE DETAILS
FRANCE Wright: Pt states she has diarrhea after taking PCN as her only reaction. Pt developed a rash after taking Ceftriaxone. States Benadryl makes her chest burn, will give Hydroxyzine. No respiratory distress, pt is otherwise stable. FRANCE Peralta: Discussed results with pt, will f/u with her ob/gyn. Pt Rx macrobid for suspected uti. Pt given return precautions

## 2021-06-12 NOTE — ED PROVIDER NOTE - NSFOLLOWUPINSTRUCTIONS_ED_ALL_ED_FT
A threatened miscarriage is the term for vaginal bleeding during your first 20 weeks of pregnancy but the pregnancy has not ended. If you have vaginal bleeding during this time, your health care provider will do tests to check if you are still pregnant. If the tests show you are still pregnant and the developing baby (fetus) inside your womb (uterus) is still growing, your condition is considered a threatened miscarriage. A threatened miscarriage does not mean your pregnancy will end, but it does increase the risk of losing your pregnancy. It is important to have close follow up with your obstetrician.    SEEK IMMEDIATE MEDICAL CARE IF YOU HAVE ANY OF THE FOLLOWING SYMPTOMS: heavy vaginal bleeding, severe low back or abdominal cramps, fever/chills, or lightheadedness/dizziness.

## 2021-06-12 NOTE — ED ADULT NURSE REASSESSMENT NOTE - NS ED NURSE REASSESS COMMENT FT1
pt developed rash and total body itchiness during administration of ceftriaxone, medication stopped, FRANCE Chacon at bedside. Atarax given as ordered

## 2021-06-12 NOTE — ED PROVIDER NOTE - OBJECTIVE STATEMENT
31 Y/O F PMH PCOS, HELP, Missed ,  LMP 3/5/21 though periods are irregular due to PCOS C/O vaginal bleeding, dysuria x 2 days and lower abdominal cramping x 3 weeks. Pt states she initially had an U/S showing a retroplacental hematoma but states she had a repeat U/S which was negative. Pt states that she hd chills today but denies fever, states the pain is 6/10 but pt declines pain medication. Pt states she went to the bathroom two times today and noticed bleeding which was initially scant. Pt denies any other sx or acute complaints.

## 2021-06-13 VITALS
SYSTOLIC BLOOD PRESSURE: 97 MMHG | HEART RATE: 93 BPM | OXYGEN SATURATION: 100 % | RESPIRATION RATE: 16 BRPM | DIASTOLIC BLOOD PRESSURE: 57 MMHG | TEMPERATURE: 98 F

## 2021-06-13 PROCEDURE — 76801 OB US < 14 WKS SINGLE FETUS: CPT | Mod: 26

## 2021-06-13 RX ORDER — METHENAMINE MANDELATE 1 G
1 TABLET ORAL
Qty: 10 | Refills: 0
Start: 2021-06-13 | End: 2021-06-17

## 2021-06-14 LAB
CULTURE RESULTS: SIGNIFICANT CHANGE UP
SPECIMEN SOURCE: SIGNIFICANT CHANGE UP

## 2021-06-17 ENCOUNTER — APPOINTMENT (OUTPATIENT)
Dept: ANTEPARTUM | Facility: CLINIC | Age: 31
End: 2021-06-17

## 2021-08-04 ENCOUNTER — APPOINTMENT (OUTPATIENT)
Dept: ANTEPARTUM | Facility: CLINIC | Age: 31
End: 2021-08-04
Payer: COMMERCIAL

## 2021-08-04 PROCEDURE — 76811 OB US DETAILED SNGL FETUS: CPT

## 2021-08-26 ENCOUNTER — OUTPATIENT (OUTPATIENT)
Dept: INPATIENT UNIT | Facility: HOSPITAL | Age: 31
LOS: 1 days | Discharge: HOME | End: 2021-08-26
Payer: COMMERCIAL

## 2021-08-26 ENCOUNTER — EMERGENCY (EMERGENCY)
Facility: HOSPITAL | Age: 31
LOS: 1 days | Discharge: STILL ADMITTED OR ADMINISTRATIVE | End: 2021-08-26
Admitting: EMERGENCY MEDICINE
Payer: COMMERCIAL

## 2021-08-26 VITALS
HEART RATE: 98 BPM | DIASTOLIC BLOOD PRESSURE: 80 MMHG | RESPIRATION RATE: 16 BRPM | OXYGEN SATURATION: 98 % | SYSTOLIC BLOOD PRESSURE: 127 MMHG | HEIGHT: 60 IN | TEMPERATURE: 98 F

## 2021-08-26 VITALS
TEMPERATURE: 98 F | RESPIRATION RATE: 16 BRPM | DIASTOLIC BLOOD PRESSURE: 66 MMHG | SYSTOLIC BLOOD PRESSURE: 116 MMHG | HEART RATE: 93 BPM

## 2021-08-26 VITALS — TEMPERATURE: 98 F

## 2021-08-26 DIAGNOSIS — Z98.891 HISTORY OF UTERINE SCAR FROM PREVIOUS SURGERY: Chronic | ICD-10-CM

## 2021-08-26 DIAGNOSIS — Z3A.00 WEEKS OF GESTATION OF PREGNANCY NOT SPECIFIED: ICD-10-CM

## 2021-08-26 DIAGNOSIS — O26.899 OTHER SPECIFIED PREGNANCY RELATED CONDITIONS, UNSPECIFIED TRIMESTER: ICD-10-CM

## 2021-08-26 DIAGNOSIS — Z98.890 OTHER SPECIFIED POSTPROCEDURAL STATES: Chronic | ICD-10-CM

## 2021-08-26 LAB
APPEARANCE UR: ABNORMAL
BACTERIA # UR AUTO: ABNORMAL
BILIRUB UR-MCNC: NEGATIVE — SIGNIFICANT CHANGE UP
COLOR SPEC: SIGNIFICANT CHANGE UP
DIFF PNL FLD: NEGATIVE — SIGNIFICANT CHANGE UP
EPI CELLS # UR: 11 /HPF — HIGH (ref 0–5)
GLUCOSE UR QL: NEGATIVE — SIGNIFICANT CHANGE UP
HYALINE CASTS # UR AUTO: 2 /LPF — SIGNIFICANT CHANGE UP (ref 0–7)
KETONES UR-MCNC: NEGATIVE — SIGNIFICANT CHANGE UP
LEUKOCYTE ESTERASE UR-ACNC: NEGATIVE — SIGNIFICANT CHANGE UP
NITRITE UR-MCNC: NEGATIVE — SIGNIFICANT CHANGE UP
PH UR: 7.5 — SIGNIFICANT CHANGE UP (ref 5–8)
PROT UR-MCNC: NEGATIVE — SIGNIFICANT CHANGE UP
RBC CASTS # UR COMP ASSIST: 1 /HPF — SIGNIFICANT CHANGE UP (ref 0–4)
SP GR SPEC: 1.01 — SIGNIFICANT CHANGE UP (ref 1–1.05)
UROBILINOGEN FLD QL: SIGNIFICANT CHANGE UP
WBC UR QL: 5 /HPF — SIGNIFICANT CHANGE UP (ref 0–5)

## 2021-08-26 PROCEDURE — L9993: CPT

## 2021-08-26 PROCEDURE — 76817 TRANSVAGINAL US OBSTETRIC: CPT | Mod: 26

## 2021-08-26 PROCEDURE — 76815 OB US LIMITED FETUS(S): CPT | Mod: 26

## 2021-08-26 PROCEDURE — 99213 OFFICE O/P EST LOW 20 MIN: CPT | Mod: 25

## 2021-08-26 NOTE — ED ADULT TRIAGE NOTE - CHIEF COMPLAINT QUOTE
Pt presents to ED ambulatory from home with c/o abdominal cramping. pelvic pain, and decreased fetal movement x 2 days. Pt is 22 weeks 5 days, due date , Dr. Dove is OBGYN, . Spoke with Katerin in L&D who advised to send pt up.

## 2021-08-26 NOTE — OB PROVIDER TRIAGE NOTE - NSHPLABSRESULTS_GEN_ALL_CORE
Urinalysis Basic - ( 26 Aug 2021 12:53 )    Color: Light Yellow / Appearance: Slightly Turbid / S.013 / pH: x  Gluc: x / Ketone: Negative  / Bili: Negative / Urobili: <2 mg/dL   Blood: x / Protein: Negative / Nitrite: Negative   Leuk Esterase: Negative / RBC: 1 /HPF / WBC 5 /HPF   Sq Epi: x / Non Sq Epi: 11 /HPF / Bacteria: Few

## 2021-08-26 NOTE — OB PROVIDER TRIAGE NOTE - YOU WERE IN THE HOSPITAL FOR:
no evidence of PTL at this time  pt condones feeling gross FM currently now  d/w MD Whittington  pt to be discharged home with  OB labor instructions  pt to follow up with OBGYN as scheduled  Reassurance offered

## 2021-08-26 NOTE — OB RN TRIAGE NOTE - NSICDXPASTMEDICALHX_GEN_ALL_CORE_FT
PAST MEDICAL HISTORY:  Anemia B Twelve Deficiency     COVID-19 vaccine series completed     h/o Gall Bladder Disease     HELLP syndrome 2016    Menstrual Disorder

## 2021-08-26 NOTE — OB PROVIDER TRIAGE NOTE - HISTORY OF PRESENT ILLNESS
29 y/o  at 22.5 weeks gestation sent up from ER via wheelchair c/o constant pelvic cramping for the last several days, pain 3 out of 10 on pain scale, with reports of decreased FM today. Denies lof, vb. Pt is COVID vaccinated.    AP course uncomplicated  Allergies:  -Cipro, erythromycin, and PCN (GI intolerance)  -Ceftriaxone (rash, swelling, hives)  Current medications:  -ASA 81mg  -PNV  OBGYN history:  -2016 primary c/s NRFHT & HELLP 5lbs 13oz  -1 SAB w/ D&C  Medical history:  -PP depression  Surgical history:  -c/s  -lap silvia 2008  -D&C with SAB 2018  - D&C s/p bleeding

## 2021-08-26 NOTE — OB PROVIDER TRIAGE NOTE - NSOBPROVIDERNOTE_OBGYN_ALL_OB_FT
no evidence of PTL at this time  pt condones feeling gross FM  d/w no evidence of PTL at this time  pt condones feeling gross FM currently now  d/w MD Whittington  pt to be discharged home with  OB labor instructions  pt to follow up with OBGYN as scheduled  Reassurance offered

## 2021-08-26 NOTE — OB PROVIDER TRIAGE NOTE - NSGESTATIONAGE1_OBGYN_ALL_OB_NU
Condition:: Abscess
Please Describe Your Condition:: Patient complains of a painful red growth that developed above his groin in the last few weeks after his skin checks.  Due to discomfort he requests treatment today.
38

## 2021-08-26 NOTE — OB RN TRIAGE NOTE - NS_OBGYNHISTORY_OBGYN_ALL_OB_FT
11/17/16 primary c/s@ 38 weeks after srom/induction of labor. NRFHT, HELLP. discharged on antihypertensives x 2 weeks then normotensive. 5-13 male  SAB/d&c 2020    d&c 2009 for DUB

## 2021-08-26 NOTE — OB PROVIDER TRIAGE NOTE - NSHPPHYSICALEXAM_GEN_ALL_CORE
SSE: cervix appears closed  TVS: cervical length- 4.37cm & 4.5cm  TAS: MVP- 6.29  Fetal HR 154bpm  EFW: 1lb 6oz  abdomen: gravid, soft, nontender  TOCO: no contractions noted    Vital Signs Last 24 Hrs  T(C): 36.8 (26 Aug 2021 12:22), Max: 36.8 (26 Aug 2021 11:51)  T(F): 98.2 (26 Aug 2021 12:22), Max: 98.3 (26 Aug 2021 11:51)  HR: 93 (26 Aug 2021 12:22) (93 - 98)  BP: 116/66 (26 Aug 2021 12:22) (116/66 - 127/80)  BP(mean): --  RR: 16 (26 Aug 2021 12:22) (16 - 16)  SpO2: 98% (26 Aug 2021 11:51) (98% - 98%)

## 2021-09-08 ENCOUNTER — EMERGENCY (EMERGENCY)
Facility: HOSPITAL | Age: 31
LOS: 1 days | Discharge: HOME | End: 2021-09-08
Attending: PERSONAL EMERGENCY RESPONSE ATTENDANT | Admitting: STUDENT IN AN ORGANIZED HEALTH CARE EDUCATION/TRAINING PROGRAM
Payer: COMMERCIAL

## 2021-09-08 VITALS
OXYGEN SATURATION: 100 % | SYSTOLIC BLOOD PRESSURE: 118 MMHG | RESPIRATION RATE: 18 BRPM | DIASTOLIC BLOOD PRESSURE: 83 MMHG | TEMPERATURE: 98 F | HEIGHT: 60 IN | HEART RATE: 99 BPM

## 2021-09-08 DIAGNOSIS — Z98.891 HISTORY OF UTERINE SCAR FROM PREVIOUS SURGERY: Chronic | ICD-10-CM

## 2021-09-08 DIAGNOSIS — Z98.890 OTHER SPECIFIED POSTPROCEDURAL STATES: Chronic | ICD-10-CM

## 2021-09-08 PROBLEM — Z92.29 PERSONAL HISTORY OF OTHER DRUG THERAPY: Chronic | Status: ACTIVE | Noted: 2021-08-26

## 2021-09-08 LAB
ALBUMIN SERPL ELPH-MCNC: 3.5 G/DL — SIGNIFICANT CHANGE UP (ref 3.3–5)
ALP SERPL-CCNC: 94 U/L — SIGNIFICANT CHANGE UP (ref 40–120)
ALT FLD-CCNC: 8 U/L — SIGNIFICANT CHANGE UP (ref 4–33)
ANION GAP SERPL CALC-SCNC: 12 MMOL/L — SIGNIFICANT CHANGE UP (ref 7–14)
APPEARANCE UR: CLEAR — SIGNIFICANT CHANGE UP
APTT BLD: 29.9 SEC — SIGNIFICANT CHANGE UP (ref 27–36.3)
AST SERPL-CCNC: 9 U/L — SIGNIFICANT CHANGE UP (ref 4–32)
B PERT DNA SPEC QL NAA+PROBE: SIGNIFICANT CHANGE UP
B PERT+PARAPERT DNA PNL SPEC NAA+PROBE: SIGNIFICANT CHANGE UP
BASE EXCESS BLDV CALC-SCNC: -3.2 MMOL/L — LOW (ref -2–3)
BASOPHILS # BLD AUTO: 0.03 K/UL — SIGNIFICANT CHANGE UP (ref 0–0.2)
BASOPHILS NFR BLD AUTO: 0.2 % — SIGNIFICANT CHANGE UP (ref 0–2)
BILIRUB SERPL-MCNC: <0.2 MG/DL — SIGNIFICANT CHANGE UP (ref 0.2–1.2)
BILIRUB UR-MCNC: NEGATIVE — SIGNIFICANT CHANGE UP
BLD GP AB SCN SERPL QL: NEGATIVE — SIGNIFICANT CHANGE UP
BLOOD GAS VENOUS COMPREHENSIVE RESULT: SIGNIFICANT CHANGE UP
BORDETELLA PARAPERTUSSIS (RAPRVP): SIGNIFICANT CHANGE UP
BUN SERPL-MCNC: 5 MG/DL — LOW (ref 7–23)
C PNEUM DNA SPEC QL NAA+PROBE: SIGNIFICANT CHANGE UP
CALCIUM SERPL-MCNC: 8.8 MG/DL — SIGNIFICANT CHANGE UP (ref 8.4–10.5)
CHLORIDE BLDV-SCNC: 104 MMOL/L — SIGNIFICANT CHANGE UP (ref 96–108)
CHLORIDE SERPL-SCNC: 102 MMOL/L — SIGNIFICANT CHANGE UP (ref 98–107)
CO2 BLDV-SCNC: 24 MMOL/L — SIGNIFICANT CHANGE UP (ref 22–26)
CO2 SERPL-SCNC: 20 MMOL/L — LOW (ref 22–31)
COLOR SPEC: SIGNIFICANT CHANGE UP
CREAT SERPL-MCNC: 0.4 MG/DL — LOW (ref 0.5–1.3)
DIFF PNL FLD: NEGATIVE — SIGNIFICANT CHANGE UP
EOSINOPHIL # BLD AUTO: 0.08 K/UL — SIGNIFICANT CHANGE UP (ref 0–0.5)
EOSINOPHIL NFR BLD AUTO: 0.6 % — SIGNIFICANT CHANGE UP (ref 0–6)
FLUAV SUBTYP SPEC NAA+PROBE: SIGNIFICANT CHANGE UP
FLUBV RNA SPEC QL NAA+PROBE: SIGNIFICANT CHANGE UP
GAS PNL BLDV: 135 MMOL/L — LOW (ref 136–145)
GLUCOSE BLDV-MCNC: 77 MG/DL — SIGNIFICANT CHANGE UP (ref 70–99)
GLUCOSE SERPL-MCNC: 75 MG/DL — SIGNIFICANT CHANGE UP (ref 70–99)
GLUCOSE UR QL: NEGATIVE — SIGNIFICANT CHANGE UP
HADV DNA SPEC QL NAA+PROBE: SIGNIFICANT CHANGE UP
HCO3 BLDV-SCNC: 23 MMOL/L — SIGNIFICANT CHANGE UP (ref 22–29)
HCOV 229E RNA SPEC QL NAA+PROBE: SIGNIFICANT CHANGE UP
HCOV HKU1 RNA SPEC QL NAA+PROBE: SIGNIFICANT CHANGE UP
HCOV NL63 RNA SPEC QL NAA+PROBE: SIGNIFICANT CHANGE UP
HCOV OC43 RNA SPEC QL NAA+PROBE: SIGNIFICANT CHANGE UP
HCT VFR BLD CALC: 32.7 % — LOW (ref 34.5–45)
HCT VFR BLDA CALC: 33 % — LOW (ref 34.5–46.5)
HGB BLD CALC-MCNC: 11.1 G/DL — LOW (ref 11.5–15.5)
HGB BLD-MCNC: 10.8 G/DL — LOW (ref 11.5–15.5)
HMPV RNA SPEC QL NAA+PROBE: SIGNIFICANT CHANGE UP
HPIV1 RNA SPEC QL NAA+PROBE: SIGNIFICANT CHANGE UP
HPIV2 RNA SPEC QL NAA+PROBE: SIGNIFICANT CHANGE UP
HPIV3 RNA SPEC QL NAA+PROBE: SIGNIFICANT CHANGE UP
HPIV4 RNA SPEC QL NAA+PROBE: SIGNIFICANT CHANGE UP
IANC: 10.62 K/UL — HIGH (ref 1.5–8.5)
IMM GRANULOCYTES NFR BLD AUTO: 0.7 % — SIGNIFICANT CHANGE UP (ref 0–1.5)
INR BLD: 1.17 RATIO — HIGH (ref 0.88–1.16)
KETONES UR-MCNC: ABNORMAL
LACTATE BLDV-MCNC: 1.4 MMOL/L — SIGNIFICANT CHANGE UP (ref 0.5–2)
LEUKOCYTE ESTERASE UR-ACNC: NEGATIVE — SIGNIFICANT CHANGE UP
LYMPHOCYTES # BLD AUTO: 19 % — SIGNIFICANT CHANGE UP (ref 13–44)
LYMPHOCYTES # BLD AUTO: 2.72 K/UL — SIGNIFICANT CHANGE UP (ref 1–3.3)
M PNEUMO DNA SPEC QL NAA+PROBE: SIGNIFICANT CHANGE UP
MCHC RBC-ENTMCNC: 30.4 PG — SIGNIFICANT CHANGE UP (ref 27–34)
MCHC RBC-ENTMCNC: 33 GM/DL — SIGNIFICANT CHANGE UP (ref 32–36)
MCV RBC AUTO: 92.1 FL — SIGNIFICANT CHANGE UP (ref 80–100)
MONOCYTES # BLD AUTO: 0.73 K/UL — SIGNIFICANT CHANGE UP (ref 0–0.9)
MONOCYTES NFR BLD AUTO: 5.1 % — SIGNIFICANT CHANGE UP (ref 2–14)
NEUTROPHILS # BLD AUTO: 10.62 K/UL — HIGH (ref 1.8–7.4)
NEUTROPHILS NFR BLD AUTO: 74.4 % — SIGNIFICANT CHANGE UP (ref 43–77)
NITRITE UR-MCNC: NEGATIVE — SIGNIFICANT CHANGE UP
NRBC # BLD: 0 /100 WBCS — SIGNIFICANT CHANGE UP
NRBC # FLD: 0 K/UL — SIGNIFICANT CHANGE UP
PCO2 BLDV: 43 MMHG — HIGH (ref 39–42)
PH BLDV: 7.33 — SIGNIFICANT CHANGE UP (ref 7.32–7.43)
PH UR: 6.5 — SIGNIFICANT CHANGE UP (ref 5–8)
PLATELET # BLD AUTO: 369 K/UL — SIGNIFICANT CHANGE UP (ref 150–400)
PO2 BLDV: 28 MMHG — SIGNIFICANT CHANGE UP
POTASSIUM BLDV-SCNC: 4 MMOL/L — SIGNIFICANT CHANGE UP (ref 3.5–5.1)
POTASSIUM SERPL-MCNC: 4 MMOL/L — SIGNIFICANT CHANGE UP (ref 3.5–5.3)
POTASSIUM SERPL-SCNC: 4 MMOL/L — SIGNIFICANT CHANGE UP (ref 3.5–5.3)
PROT SERPL-MCNC: 6.8 G/DL — SIGNIFICANT CHANGE UP (ref 6–8.3)
PROT UR-MCNC: NEGATIVE — SIGNIFICANT CHANGE UP
PROTHROM AB SERPL-ACNC: 13.4 SEC — SIGNIFICANT CHANGE UP (ref 10.6–13.6)
RAPID RVP RESULT: SIGNIFICANT CHANGE UP
RBC # BLD: 3.55 M/UL — LOW (ref 3.8–5.2)
RBC # FLD: 13.2 % — SIGNIFICANT CHANGE UP (ref 10.3–14.5)
RH IG SCN BLD-IMP: POSITIVE — SIGNIFICANT CHANGE UP
RSV RNA SPEC QL NAA+PROBE: SIGNIFICANT CHANGE UP
RV+EV RNA SPEC QL NAA+PROBE: SIGNIFICANT CHANGE UP
SAO2 % BLDV: 37.5 % — SIGNIFICANT CHANGE UP
SARS-COV-2 RNA SPEC QL NAA+PROBE: SIGNIFICANT CHANGE UP
SODIUM SERPL-SCNC: 134 MMOL/L — LOW (ref 135–145)
SP GR SPEC: 1.01 — SIGNIFICANT CHANGE UP (ref 1–1.05)
UROBILINOGEN FLD QL: SIGNIFICANT CHANGE UP
WBC # BLD: 14.28 K/UL — HIGH (ref 3.8–10.5)
WBC # FLD AUTO: 14.28 K/UL — HIGH (ref 3.8–10.5)

## 2021-09-08 PROCEDURE — 93010 ELECTROCARDIOGRAM REPORT: CPT

## 2021-09-08 PROCEDURE — 99218: CPT | Mod: 25

## 2021-09-08 PROCEDURE — 93971 EXTREMITY STUDY: CPT | Mod: 26,LT

## 2021-09-08 PROCEDURE — 70544 MR ANGIOGRAPHY HEAD W/O DYE: CPT | Mod: 26

## 2021-09-08 RX ORDER — SODIUM CHLORIDE 9 MG/ML
1000 INJECTION INTRAMUSCULAR; INTRAVENOUS; SUBCUTANEOUS ONCE
Refills: 0 | Status: COMPLETED | OUTPATIENT
Start: 2021-09-08 | End: 2021-09-08

## 2021-09-08 RX ADMIN — SODIUM CHLORIDE 1000 MILLILITER(S): 9 INJECTION INTRAMUSCULAR; INTRAVENOUS; SUBCUTANEOUS at 12:22

## 2021-09-08 NOTE — ED ADULT NURSE NOTE - OBJECTIVE STATEMENT
Patient received in intake room 13. alert and oriented x4, ambulates. 24 weeks pregnant. complaining of headache to back of head since yesterday and blurry vision since this morning. also endorses intermittent palpitations. no weakness to extremities, facial droop, slurred speech. denies fevers, chills, chest pain, sob. labs sent. 20 gauge IV placed to left arm. will monitor.

## 2021-09-08 NOTE — ED PROVIDER NOTE - OBJECTIVE STATEMENT
30 year old female with no known PMH, 24 wks gestation, presents with headaches, diplopia, room spinning sensation and feeling off balance since yesterday. Pt states that headache has now resolved however the diplopia and blurry vision made her concerned enough to come to the ED. Pt also reports that 3 days ago she had calf pain that has also now resolved.  Pt endorses feeling normal fetal movement. Denies any nausea, vomiting, weakness, numbness or tingling sensation, fevers, chills or any other associated complaints.

## 2021-09-08 NOTE — ED PROVIDER NOTE - CLINICAL SUMMARY MEDICAL DECISION MAKING FREE TEXT BOX
30 year old female with no known PMH, 24 wks gestation, presents with headaches, diplopia, room spinning sensation and feeling off balance since yesterday. HD stable, not hypertensive. Neuro non-focal. Imp: Concern for cerebral sinus thrombosis, inconsistent with pre-eclampsia. Plan for labs, CDU for MRI.

## 2021-09-08 NOTE — CONSULT NOTE ADULT - ASSESSMENT
A 30 year old obese female at 24 weeks gestation with PMH of anemia, B-12 deficiency, gall bladder disease, HELLP syndrome (in 2016), hx of IBS, possible chronic sciatica has presented to the ED due to concerns of ongoing episodic diplopia and blurry vision since yesterday. In addition, she has an intermittent pounding headache (8/10 at worst), from bl temples to back of her head, "achy" in quality which started at work yesterday. Had some associated nausea, tingling in left hand and left foot, palpitations on left side of chest, felt "foggy", couldn't concentrate at times during episodes. Neurology consulted for concerns of cerebral sinus thrombosis. No brain imaging done yet. Neurological exam was benign except for diplopia in all directions.     Impression: headache, blurry vision, diplopia, dizziness, nausea may be 2/2 possible cerebral sinus thrombosis vs. occipital pathology 2/2 pregnancy vs. obesity.     Recommendations:  [] CDU with MRI brain w and w/o contrast  []MRV of brain  [] toxic/metabolic/infectious pathology per primary team  []Neurology to follow with further recommendations    Case to be discussed with neurologist, Dr. Charles.      A 30 year old obese female at 24 weeks gestation with PMH of anemia, B-12 deficiency, gall bladder disease, HELLP syndrome (in 2016), hx of IBS, possible chronic sciatica has presented to the ED due to concerns of ongoing episodic diplopia and blurry vision since yesterday. In addition, she has an intermittent pounding headache (8/10 at worst), from bl temples to back of her head, "achy" in quality which started at work yesterday. Had some associated nausea, tingling in left hand and left foot, palpitations on left side of chest, felt "foggy", couldn't concentrate at times during episodes. Neurology consulted for concerns of cerebral sinus thrombosis. No brain imaging done yet. Neurological exam was benign except for diplopia in all directions.     Impression: headache, blurry vision, diplopia, dizziness, nausea may be 2/2 possible cerebral sinus thrombosis vs. occipital pathology 2/2 pregnancy vs. obesity.     Recommendations:  [] CDU with MRI brain w and w/o contrast  []MRV of brain  [] toxic/metabolic/infectious pathology per primary team  []F/U with OB doctor outpatient  []Neurology to follow with further recommendations    Case to be discussed with neurologist, Dr. Charles.      A 30 year old obese female at 24 weeks gestation with PMH of anemia, B-12 deficiency, gall bladder disease, HELLP syndrome (in 2016), hx of IBS, possible chronic sciatica has presented to the ED due to concerns of ongoing episodic diplopia and blurry vision since yesterday. In addition, she has an intermittent pounding headache (8/10 at worst), from bl temples to back of her head, "achy" in quality which started at work yesterday. Had some associated nausea, tingling in left hand and left foot, palpitations on left side of chest, felt "foggy", couldn't concentrate at times during episodes. Currently endorses double vision and dizzyness. Neurology consulted for concerns of cerebral sinus thrombosis. No brain imaging done yet. Neurological exam was benign except for diplopia in all directions.     Impression: headache, blurry vision, diplopia, dizziness, nausea may be 2/2 possible cerebral sinus thrombosis vs. occipital pathology 2/2 pregnancy vs. obesity.     Recommendations:  [] CDU with MRI brain w and w/o contrast  []MRV of brain  [] toxic/metabolic/infectious pathology per primary team  []F/U with OB doctor outpatient  []Neurology to follow with further recommendations    Case to be discussed with neurologist, Dr. Charles.      A 30 year old obese female at 24 weeks gestation with PMH of anemia, B-12 deficiency, gall bladder disease, HELLP syndrome (in 2016), hx of IBS, possible chronic sciatica has presented to the ED due to concerns of ongoing episodic diplopia and blurry vision since yesterday. In addition, she has an intermittent pounding headache (8/10 at worst), from bl temples to back of her head, "achy" in quality which started at work yesterday. Had some associated nausea, tingling in left hand and left foot, palpitations on left side of chest, felt "foggy", couldn't concentrate at times during episodes. Currently endorses double vision and dizzyness. Neurology consulted for concerns of cerebral sinus thrombosis. No brain imaging done yet. Neurological exam was benign except for diplopia in all directions.     Impression: headache, blurry vision, diplopia, dizziness, nausea may be 2/2 possible cerebral sinus thrombosis vs. occipital pathology 2/2 pregnancy vs. obesity.     Recommendations:  [] CDU with MRI brain w and w/o contrast  []MRV of brain  [] toxic/metabolic/infectious pathology per primary team  []Hematology consult for hypercoagulable work up.  []F/U with OB doctor outpatient  []Neurology to follow with further recommendations    Case to be discussed with neurologist, Dr. Charles.      A 30 year old obese female at 24 weeks gestation with PMH of anemia, B-12 deficiency, gall bladder disease, HELLP syndrome (in 2016), hx of IBS, possible chronic sciatica has presented to the ED due to concerns of ongoing episodic diplopia and blurry vision since yesterday. In addition, she has an intermittent pounding headache (8/10 at worst), from bl temples to back of her head, "achy" in quality which started at work yesterday. Had some associated nausea, tingling in left hand and left foot, palpitations on left side of chest, felt "foggy", couldn't concentrate at times during episodes. Currently endorses double vision and dizzyness. Neurology consulted for concerns of cerebral sinus thrombosis. No brain imaging done yet. Neurological exam was benign except for diplopia in all directions.     Impression: headache, blurry vision, diplopia, dizziness, nausea may be 2/2 possible cerebral sinus thrombosis vs. occipital pathology 2/2 pregnancy vs. obesity.     Recommendations:  [] CDU with MRI brain w and w/o contrast  []MRV of brain  [] toxic/metabolic/infectious pathology per primary team  []Hematology consult for hypercoagulable work up.  []Neuro checks Q4   []F/U with OB doctor outpatient  []Neurology to follow with further recommendations    Case to be discussed with neurologist, Dr. Charles.      A 30 year old obese female at 24 weeks gestation with PMH of anemia, B-12 deficiency, gall bladder disease, HELLP syndrome (in 2016), hx of IBS, possible chronic sciatica has presented to the ED due to concerns of ongoing episodic diplopia and blurry vision since yesterday. In addition, she has an intermittent pounding headache (8/10 at worst), from bl temples to back of her head, "achy" in quality which started at work yesterday. Had some associated nausea, tingling in left hand and left foot, palpitations on left side of chest, felt "foggy", couldn't concentrate at times during episodes. Currently endorses double vision and dizzyness. Neurology consulted for concerns of cerebral sinus thrombosis. No brain imaging done yet. Neurological exam was benign except for diplopia in all directions.     Impression: headache, blurry vision, diplopia, dizziness, nausea may be 2/2 possible cerebral venous sinus thrombosis vs. occipital pathology 2/2 pregnancy vs. obesity.     Recommendations:  [] CDU with MRI brain w and w/o contrast  []MRV of brain  [] toxic/metabolic/infectious pathology per primary team  []Hematology consult for hypercoagulable work up.  []Neuro checks Q4   []F/U with OB doctor outpatient  []Neurology to follow with further recommendations    Case to be discussed with neurologist, Dr. Charles.      A 30 year old obese female at 24 weeks gestation with PMH of anemia, B-12 deficiency, gall bladder disease, HELLP syndrome (in 2016), hx of IBS, possible chronic sciatica has presented to the ED due to concerns of ongoing episodic diplopia and blurry vision since yesterday. In addition, she has an intermittent pounding headache (8/10 at worst), from bl temples to back of her head, "achy" in quality which started at work yesterday. Had some associated nausea, tingling in left hand and left foot, palpitations on left side of chest, felt "foggy", couldn't concentrate at times during episodes. Currently endorses double vision and dizzyness. Neurology consulted for concerns of cerebral sinus thrombosis. No brain imaging done yet. Neurological exam was benign except for diplopia in all directions.     Impression: headache, blurry vision, diplopia, dizziness, nausea may be 2/2 possible cerebral venous sinus thrombosis vs. occipital pathology 2/2 pregnancy vs. obesity.     Recommendations:  []MRV of brain  [] toxic/metabolic/infectious pathology per primary team  []Hematology consult for hypercoagulable work up  []Neuro checks Q4   []F/U with OB doctor outpatient  []Neurology to follow with further recommendations    Case to be discussed with neurologist, Dr. Charles.      A 30 year old obese female at 24 weeks gestation with PMH of anemia, B-12 deficiency, gall bladder disease, HELLP syndrome (in 2016), hx of IBS, possible chronic sciatica has presented to the ED due to concerns of ongoing episodic diplopia and blurry vision since yesterday. In addition, she has an intermittent pounding headache (8/10 at worst), from bl temples to back of her head, "achy" in quality which started at work yesterday. Had some associated nausea, tingling in left hand and left foot, palpitations on left side of chest, felt "foggy", couldn't concentrate at times during episodes. Currently endorses double vision and dizzyness. Neurology consulted for concerns of cerebral sinus thrombosis. No brain imaging done yet. Neurological exam was benign except for diplopia in all directions.     Impression: headache, blurry vision, diplopia, dizziness, nausea may be 2/2 possible cerebral venous sinus thrombosis vs. occipital pathology 2/2 pregnancy [Neurology Attending - What is this ?????] vs. obesity.     Recommendations:  []MRV of brain  [] toxic/metabolic/infectious pathology per primary team  []Hematology consult for hypercoagulable work up  []Neuro checks Q4   []F/U with OB doctor outpatient  []Neurology to follow with further recommendations    Case to be discussed with neurologist, Dr. Charles.       NEUROLOGY ATTENDING ADDENDUM    I personally interviewed, examined, and participated in the care of this patient, on rounds 9/9/21 with the neurology consult service team.  Reviewed findings and management plan with them and ED team.  In addition to or instead of the Hx and findings and plan reported above, or in particular, I note as follows:    Headache resolved.  She did not have true diplopia; when focusing of vision is perturbed a person may report double vision but THERE ARE NOT TWO DISTINCT-WELL_DEFINED IMAGES.  Rather there is a blur AND the cortical processing in effect outlines the margins of the unfocused image resulting in a perception of two overlapping but indistinct images (not true diplopia).      Obese.  Awake, alert, normal comprehension, expression, prosody, articulation.  PERRL.  Confrontation visual fields intact.  Normal optic discs on fuduscopy.  Conjugate full-range gaze with smooth pursuit.  Clint symmetric intact.  Normal brisk gait.  Walks well on toes/heels; hops well on each foot.      No tenderness to palpation of structures in the cervical region.  Normal painless ROM of neck in all planes.      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       IMPRESSION: resolved tension-type headache.

## 2021-09-08 NOTE — ED PROVIDER NOTE - ATTENDING CONTRIBUTION TO CARE
Attending MD Manrique.  Agree with above.  Pt is a 29 yo female at 24 wks gestation with no sig pmhx who presents with headaches, diplopia, room spinning sensation and feeling off balance since yesterday.  HA now resolved but pt states that she awoke this morning with blurry vision and diplopia.  These sxs are all now resolved.  3 days ago she also states she had calf pain that has also now resolved.  Pt endorses feeling normal fetal movement.  VS’s non-actionable.  EKG non-actionable.  Neuro exam non-focal.  PT well appearing.  No diplopia.  No calf TTP bilaterally.  No LE edema. Planned screening labs, LE duplex.

## 2021-09-08 NOTE — ED ADULT TRIAGE NOTE - CHIEF COMPLAINT QUOTE
Patient has c/o blurred vision and a headache to the back of her head. She also c/o palpitations. These symptoms started yesterday when she was a t work. Patient is 24 weeks pregnant.

## 2021-09-08 NOTE — CONSULT NOTE ADULT - SUBJECTIVE AND OBJECTIVE BOX
HPI: A 30 year old female at 24 weeks gestation with PMH of anemia, B-12 deficiency, gall bladder disease, HELLP syndrome (in 2016), possible chronic sciatica has presented to the ED due to concerns of ongoing episodic diplopia and blurry vision since yesterday. In addition, she has an intermittent pounding headache, from bl temples to back of her head, "achy" in quality which started at work yesterday. Had some associated nausea, tingling in left hand and left foot, palpitations on left side of chest, felt "foggy", couldn't concentrate at times during episodes. Pt currently denies headache, blurry vision, dizziness, palpitations but endorses double vision. Pt denies any recent head trauma, hx of strokes, clotting disorders, seizures, loss of body control, LOC, hx of cancer, hx of migraines, changes in hearing, weakness.      ROS: A 10-system ROS was performed and is negative except for those items noted above and/or in the HPI.    PAST MEDICAL & SURGICAL HISTORY:  Menstrual Disorder    Anemia B Twelve Deficiency    h/o Gall Bladder Disease    HELLP syndrome  2016    COVID-19 vaccine series completed    h/o cholecystectomy   laproscopic silvia    History of   2016    History of D&amp;C  2009      FAMILY HISTORY:      SOCIAL HISTORY: SOCIAL HISTORY:     Marital Status: (  )   (  ) Single  (  )   (  )      Occupation:      Lives: (  ) alone  (  ) with children   (  ) with spouse  (  ) with parents  (  ) other     Illicit Drug Use: (  ) never used  (  ) other _____     Tobacco Use:  (  ) never smoked  (  ) former smoker  (  ) current smoker  (  ) pack year  (  ) last cigarette date     Alcohol Use:      Sexual History:        MEDICATIONS  Home Medications:  Prenatal 1 oral capsule:  (26 Aug 2021 12:30)      MEDICATIONS  (STANDING):    MEDICATIONS  (PRN):      ALLERGIES/INTOLERANCES:  Allergies  ceftriaxone (Hives (Mild to Mod))  Cipro (Diarrhea; Nausea)  erythromycin (Diarrhea; Nausea)  erythromycin (Unknown)  fish (Other)    Intolerances  penicillin (Unknown)      OBJECTIVE:  VITALS   Vital Signs Last 24 Hrs  T(C): 36.9 (08 Sep 2021 14:47), Max: 36.9 (08 Sep 2021 10:30)  T(F): 98.4 (08 Sep 2021 14:47), Max: 98.5 (08 Sep 2021 10:30)  HR: 95 (08 Sep 2021 14:47) (95 - 99)  BP: 117/74 (08 Sep 2021 14:47) (117/74 - 118/83)  BP(mean): --  RR: 18 (08 Sep 2021 14:47) (18 - 18)  SpO2: 100% (08 Sep 2021 14:47) (100% - 100%)    PHYSICAL EXAM:  Neurological Exam:  Mental Status: Orientated to self, date and place.  Attention intact.  No dysarthria, aphasia or neglect.  Knowledge intact.  Registration intact.  Short and long term memory grossly intact.      Cranial Nerves: PERRL, EOMI, VFF, no nystagmus or diplopia.  CN V1-3 intact to light touch and pinprick.  No facial asymmetry.  Hearing intact.  Tongue midline.  Sternocleidomastoid and Trapezius intact bilaterally.    Motor:   Tone: normal            Strength:     Upper extremity                      Delt       Bicep    Tricep                                               R         5/5        5/5        5/5       5/5                                            L          5/5        5/5        5/5       5/5  Lower extremity                       HF          KE          KF        DF         PF                                            R        5/5        5/5        5/5       5/5       5/5                                            L         5/5        5/5       5/5       5/5        5/5  Pronator drift: none                 Dysmetria: None to finger-nose-finger or heel-shin-heel  No truncal ataxia.    Tremor: No resting, postural or action tremor.  No myoclonus.    Sensation: intact to light touch, pinprick, vibration and proprioception    Deep Tendon Reflexes: 2+ bilateral biceps, triceps, brachioradialis, knee and ankle  Toes flexor bilaterally    Gait: normal and stable.      LABORATORY:  CBC                       10.8   14.28 )-----------( 369      ( 08 Sep 2021 13:28 )             32.7     Chem 09-08    134<L>  |  102  |  5<L>  ----------------------------<  75  4.0   |  20<L>  |  0.40<L>    Ca    8.8      08 Sep 2021 13:28    TPro  6.8  /  Alb  3.5  /  TBili  <0.2  /  DBili  x   /  AST  9   /  ALT  8   /  AlkPhos  94  09-08    LFTs LIVER FUNCTIONS - ( 08 Sep 2021 13:28 )  Alb: 3.5 g/dL / Pro: 6.8 g/dL / ALK PHOS: 94 U/L / ALT: 8 U/L / AST: 9 U/L / GGT: x           Coagulopathy PT/INR - ( 08 Sep 2021 13:28 )   PT: 13.4 sec;   INR: 1.17 ratio         PTT - ( 08 Sep 2021 13:28 )  PTT:29.9 sec  Lipid Panel   A1c   Cardiac enzymes     U/A Urinalysis Basic - ( 08 Sep 2021 14:21 )    Color: Light Yellow / Appearance: Clear / S.013 / pH: x  Gluc: x / Ketone: Large  / Bili: Negative / Urobili: <2 mg/dL   Blood: x / Protein: Negative / Nitrite: Negative   Leuk Esterase: Negative / RBC: x / WBC x   Sq Epi: x / Non Sq Epi: x / Bacteria: x      CSF  Immunological  Other    STUDIES & IMAGING:  Studies (EKG, EEG, EMG, etc):     Radiology (XR, CT, MR, U/S, TTE/CHEY): HPI: A 30 year old obese female at 24 weeks gestation with PMH of anemia, B-12 deficiency, gall bladder disease, HELLP syndrome (in 2016), hx of IBS, possible chronic sciatica has presented to the ED due to concerns of ongoing episodic diplopia and blurry vision since yesterday. In addition, she has an intermittent pounding headache (8/10 at worst), from bl temples to back of her head, "achy" in quality which started at work yesterday. Had some associated nausea, tingling in left hand and left foot, palpitations on left side of chest, felt "foggy", couldn't concentrate at times during episodes. Pt currently denies headache, blurry vision, dizziness, palpitations but endorses double vision and dizzyness. Pt denies any recent head trauma, hx of strokes, clotting disorders, seizures, loss of body control, LOC, hx of cancer, hx of migraines, changes in hearing, weakness, vomiting, fever/chills.     PT has hx of HELLP syndrome in 2016 - had headache, elevated BP/liver enzymes, proteinuria, low platelets at that time but no seizures. Received magnesium which helped her symptoms and she was discharged on 2 BP meds which she took for a short duration.      ROS: A 10-system ROS was performed and is negative except for those items noted above and/or in the HPI.    PAST MEDICAL & SURGICAL HISTORY:  Menstrual Disorder    Anemia B Twelve Deficiency    h/o Gall Bladder Disease    HELLP syndrome  2016    COVID-19 vaccine series completed    h/o cholecystectomy   laproscopic silvia    History of   2016    History of D&amp;C  2009      FAMILY HISTORY:      SOCIAL HISTORY: SOCIAL HISTORY:     Marital Status: (  )   (  ) Single  (  )   (  )      Occupation:      Lives: (  ) alone  (  ) with children   (  ) with spouse  (  ) with parents  (  ) other     Illicit Drug Use: (  ) never used  (  ) other _____     Tobacco Use:  (  ) never smoked  (  ) former smoker  (  ) current smoker  (  ) pack year  (  ) last cigarette date     Alcohol Use:      Sexual History:        MEDICATIONS  Home Medications:  Prenatal 1 oral capsule:  (26 Aug 2021 12:30)      MEDICATIONS  (STANDING):    MEDICATIONS  (PRN):      ALLERGIES/INTOLERANCES:  Allergies  ceftriaxone (Hives (Mild to Mod))  Cipro (Diarrhea; Nausea)  erythromycin (Diarrhea; Nausea)  erythromycin (Unknown)  fish (Other)    Intolerances  penicillin (Unknown)      OBJECTIVE:  VITALS   Vital Signs Last 24 Hrs  T(C): 36.9 (08 Sep 2021 14:47), Max: 36.9 (08 Sep 2021 10:30)  T(F): 98.4 (08 Sep 2021 14:47), Max: 98.5 (08 Sep 2021 10:30)  HR: 95 (08 Sep 2021 14:47) (95 - 99)  BP: 117/74 (08 Sep 2021 14:47) (117/74 - 118/83)  BP(mean): --  RR: 18 (08 Sep 2021 14:47) (18 - 18)  SpO2: 100% (08 Sep 2021 14:47) (100% - 100%)    PHYSICAL EXAM:  Neurological Exam:  Mental Status: Orientated to self, date and place.  Attention intact.  No dysarthria, aphasia or neglect.  Knowledge intact.  Registration intact.  Short and long term memory grossly intact.      Cranial Nerves: PERRL, EOMI, VFF, no nystagmus. Minor "diplopia" in all directions.  CN V1-3 intact to light touch.  No facial asymmetry.  Hearing intact.  Tongue midline.  Sternocleidomastoid and Trapezius intact bilaterally.    Motor:   Tone: normal            Strength:     Upper extremity                            Delt       Bicep    Tricep                                               R         5/5        5/5        5/5       5/5                                            L          5/5        5/5        5/5       5/5  Lower extremity                              HF          KE          KF        DF         PF                                            R        5/5        5/5        5/5       5/5       5/5                                            L         5/5        5/5       5/5       5/5        5/5  Pronator drift: none                 Dysmetria: None to finger-nose-finger or heel-shin-heel  No truncal ataxia.    Tremor: No resting, postural or action tremor.  No myoclonus.    Sensation: intact to light touch in all extremities    Deep Tendon Reflexes: 2+ bilateral biceps, triceps, brachioradialis, knee and ankle  Toes flexor bilaterally    Gait: normal and stable.      LABORATORY:  CBC                       10.8   14.28 )-----------( 369      ( 08 Sep 2021 13:28 )             32.7     Chem 09-08    134<L>  |  102  |  5<L>  ----------------------------<  75  4.0   |  20<L>  |  0.40<L>    Ca    8.8      08 Sep 2021 13:28    TPro  6.8  /  Alb  3.5  /  TBili  <0.2  /  DBili  x   /  AST  9   /  ALT  8   /  AlkPhos  94  -08    LFTs LIVER FUNCTIONS - ( 08 Sep 2021 13:28 )  Alb: 3.5 g/dL / Pro: 6.8 g/dL / ALK PHOS: 94 U/L / ALT: 8 U/L / AST: 9 U/L / GGT: x           Coagulopathy PT/INR - ( 08 Sep 2021 13:28 )   PT: 13.4 sec;   INR: 1.17 ratio         PTT - ( 08 Sep 2021 13:28 )  PTT:29.9 sec  Lipid Panel   A1c   Cardiac enzymes     U/A Urinalysis Basic - ( 08 Sep 2021 14:21 )    Color: Light Yellow / Appearance: Clear / S.013 / pH: x  Gluc: x / Ketone: Large  / Bili: Negative / Urobili: <2 mg/dL   Blood: x / Protein: Negative / Nitrite: Negative   Leuk Esterase: Negative / RBC: x / WBC x   Sq Epi: x / Non Sq Epi: x / Bacteria: x      CSF  Immunological  Other    STUDIES & IMAGING:  Studies (EKG, EEG, EMG, etc):     Radiology (XR, CT, MR, U/S, TTE/CHEY):   LE Doppler: No evidence of left lower extremity deep venous thrombosis. HPI: A 30 year old obese female at 24 weeks gestation with PMH of anemia, B-12 deficiency, gall bladder disease, HELLP syndrome (in 2016), hx of IBS, possible chronic sciatica has presented to the ED due to concerns of ongoing episodic diplopia and blurry vision since yesterday. In addition, she has an intermittent pounding headache (8/10 at worst), from bl temples to back of her head, "achy" in quality which started at work yesterday. Had some associated nausea, tingling in left hand and left foot, palpitations on left side of chest, felt "foggy", couldn't concentrate at times during episodes. Pt currently denies headache, blurry vision, dizziness, palpitations but endorses double vision and dizzyness. Pt denies any recent head trauma, hx of strokes, clotting disorders, seizures, loss of body control, LOC, hx of cancer, hx of migraines, changes in hearing, weakness, vomiting, fever/chills.     PT has hx of HELLP syndrome in 2016 - had headache, elevated BP/liver enzymes, proteinuria, low platelets at that time but no seizures. Received magnesium which helped her symptoms and she was discharged on 2 BP meds which she took for a short duration.      ROS: A 10-system ROS was performed and is negative except for those items noted above and/or in the HPI.    PAST MEDICAL & SURGICAL HISTORY:  Menstrual Disorder    Anemia B Twelve Deficiency    h/o Gall Bladder Disease    HELLP syndrome  2016    COVID-19 vaccine series completed    h/o cholecystectomy   laproscopic silvia    History of   2016    History of D&amp;C  2009      FAMILY HISTORY:      SOCIAL HISTORY: SOCIAL HISTORY:     Marital Status: (  )   (  ) Single  (  )   (  )      Occupation:      Lives: (  ) alone  (  ) with children   (  ) with spouse  (  ) with parents  (  ) other     Illicit Drug Use: (  ) never used  (  ) other _____     Tobacco Use:  (  ) never smoked  (  ) former smoker  (  ) current smoker  (  ) pack year  (  ) last cigarette date     Alcohol Use:      Sexual History:        MEDICATIONS  Home Medications:  Prenatal 1 oral capsule:  (26 Aug 2021 12:30)      MEDICATIONS  (STANDING):    MEDICATIONS  (PRN):      ALLERGIES/INTOLERANCES:  Allergies  ceftriaxone (Hives (Mild to Mod))  Cipro (Diarrhea; Nausea)  erythromycin (Diarrhea; Nausea)  erythromycin (Unknown)  fish (Other)    Intolerances  penicillin (Unknown)      OBJECTIVE:  VITALS   Vital Signs Last 24 Hrs  T(C): 36.9 (08 Sep 2021 14:47), Max: 36.9 (08 Sep 2021 10:30)  T(F): 98.4 (08 Sep 2021 14:47), Max: 98.5 (08 Sep 2021 10:30)  HR: 95 (08 Sep 2021 14:47) (95 - 99)  BP: 117/74 (08 Sep 2021 14:47) (117/74 - 118/83)  BP(mean): --  RR: 18 (08 Sep 2021 14:47) (18 - 18)  SpO2: 100% (08 Sep 2021 14:47) (100% - 100%)    PHYSICAL EXAM:  Neurological Exam:  Mental Status: Oriented to self, date and place.  Attention intact.  No dysarthria, aphasia or neglect.  Knowledge intact.  Registration intact.  Short and long term memory grossly intact.      Cranial Nerves: PERRL, EOMI, VFF, no nystagmus. Minor "diplopia" in all directions.  CN V1-3 intact to light touch.  No facial asymmetry.  Hearing intact.  Tongue midline.  Sternocleidomastoid and Trapezius intact bilaterally.    Motor:   Tone: normal            Strength:     Upper extremity                            Delt       Bicep    Tricep                                               R         5/5        5/5        5/5       5/5                                            L          5/5        5/5        5/5       5/5  Lower extremity                              HF          KE          KF        DF         PF                                            R        5/5        5/5        5/5       5/5       5/5                                            L         5/5        5/5       5/5       5/5        5/5  Pronator drift: none                 Dysmetria: None to finger-nose-finger or heel-shin-heel  No truncal ataxia.    Tremor: No resting, postural or action tremor.  No myoclonus.    Sensation: intact to light touch in all extremities    Deep Tendon Reflexes: 2+ bilateral biceps, triceps, brachioradialis, knee and ankle  Toes flexor bilaterally    Gait: normal and stable.      LABORATORY:  CBC                       10.8   14.28 )-----------( 369      ( 08 Sep 2021 13:28 )             32.7     Chem 09-08    134<L>  |  102  |  5<L>  ----------------------------<  75  4.0   |  20<L>  |  0.40<L>    Ca    8.8      08 Sep 2021 13:28    TPro  6.8  /  Alb  3.5  /  TBili  <0.2  /  DBili  x   /  AST  9   /  ALT  8   /  AlkPhos  94  -08    LFTs LIVER FUNCTIONS - ( 08 Sep 2021 13:28 )  Alb: 3.5 g/dL / Pro: 6.8 g/dL / ALK PHOS: 94 U/L / ALT: 8 U/L / AST: 9 U/L / GGT: x           Coagulopathy PT/INR - ( 08 Sep 2021 13:28 )   PT: 13.4 sec;   INR: 1.17 ratio         PTT - ( 08 Sep 2021 13:28 )  PTT:29.9 sec  Lipid Panel   A1c   Cardiac enzymes     U/A Urinalysis Basic - ( 08 Sep 2021 14:21 )    Color: Light Yellow / Appearance: Clear / S.013 / pH: x  Gluc: x / Ketone: Large  / Bili: Negative / Urobili: <2 mg/dL   Blood: x / Protein: Negative / Nitrite: Negative   Leuk Esterase: Negative / RBC: x / WBC x   Sq Epi: x / Non Sq Epi: x / Bacteria: x      CSF  Immunological  Other    STUDIES & IMAGING:  Studies (EKG, EEG, EMG, etc):     Radiology (XR, CT, MR, U/S, TTE/CHEY):   LE Doppler: No evidence of left lower extremity deep venous thrombosis.

## 2021-09-08 NOTE — ED CDU PROVIDER INITIAL DAY NOTE - MEDICAL DECISION MAKING DETAILS
29 y/o female, 24 weeks pregnant w/ headache, dizziness and change in vision- sent to CDU for MRI to r/o CVT, neuro/GYN consult

## 2021-09-08 NOTE — ED CDU PROVIDER INITIAL DAY NOTE - ATTENDING CONTRIBUTION TO CARE
Attending MD Manrique.  Agree with above.  Pt is a 31 yo female at 24 wks gestation with no sig pmhx who presents with headaches, diplopia, room spinning sensation and feeling off balance since yesterday.  HA now resolved but pt states that she awoke this morning with blurry vision and diplopia.  These sxs are all now resolved.  3 days ago she also states she had calf pain that has also now resolved.  Pt endorses feeling normal fetal movement.  VS’s non-actionable.  EKG non-actionable.  Neuro exam non-focal.  PT well appearing.  No diplopia.  No calf TTP bilaterally.  No LE edema. Planned screening labs, LE duplex.    Duplex non-actionable, neuro cxed and recommending CDU for MR's.  Stable for transfer.

## 2021-09-08 NOTE — ED ADULT NURSE NOTE - NSIMPLEMENTINTERV_GEN_ALL_ED
Implemented All Universal Safety Interventions:  [place] to call system. Call bell, personal items and telephone within reach. Instruct patient to call for assistance. Room bathroom lighting operational. Non-slip footwear when patient is off stretcher. Physically safe environment: no spills, clutter or unnecessary equipment. Stretcher in lowest position, wheels locked, appropriate side rails in place.

## 2021-09-08 NOTE — ED CDU PROVIDER INITIAL DAY NOTE - OBJECTIVE STATEMENT
31 y/o female, 24 weeks pregnant c/o headache and double vision xx 1 day. pt was at work yesterday and developed b/l headache which radiated to her occiput. Pt admits to feel room spinning dizziness with the headache. Pt states that she tried to eat some food and went home but the symptoms did not resolve. Pt woke up today and still had the symptoms and then she began to have double vision and became very worried. Pt called her GYN who rec to go to the ER and get evaluated. Pt was sent to CDU for MRV and neuro consult.

## 2021-09-09 VITALS
TEMPERATURE: 98 F | RESPIRATION RATE: 18 BRPM | HEART RATE: 105 BPM | SYSTOLIC BLOOD PRESSURE: 114 MMHG | OXYGEN SATURATION: 99 % | DIASTOLIC BLOOD PRESSURE: 80 MMHG

## 2021-09-09 PROCEDURE — 99217: CPT

## 2021-09-09 PROCEDURE — 99243 OFF/OP CNSLTJ NEW/EST LOW 30: CPT

## 2021-09-09 NOTE — ED CDU PROVIDER SUBSEQUENT DAY NOTE - MEDICAL DECISION MAKING DETAILS
31 y/o female, 24 weeks pregnant w/ headache, dizziness and change in vision- sent to CDU for MRI to r/o CVT, neuro/GYN consult 31 y/o female, 24 weeks pregnant w/ headache, dizziness and change in vision- sent to CDU for MRI to r/o CVT, neuro/GYN consult    Attending MD Morley:  Agree with above. Patient currently asymptomatic.  Neuro exam: strength and sensation intact in all extremities. CN 2 - 12 intact. No pronator drift. Ambulatory without difficulty. Seen by neurology and cleared for d/c. BP normal, doubt preeclampsia.

## 2021-09-09 NOTE — ED CDU PROVIDER DISPOSITION NOTE - PATIENT PORTAL LINK FT
You can access the FollowMyHealth Patient Portal offered by [facility] by registering at the following website: [email or web address]. By joining [employer]’s FollowMyHealth portal, you will also be able to view your health information using other applications (apps) compatible with our system.

## 2021-09-09 NOTE — ED CDU PROVIDER DISPOSITION NOTE - NSFOLLOWUPINSTRUCTIONS_ED_ALL_ED_FT
Rest, drink plenty of fluids. Follow up with your primary doctor and OBGYN, bring this packet which includes copies of your results. Advance activity as tolerated.  Continue all previously prescribed medications as directed.  Follow up with your primary care physician in 48-72 hours- bring copies of your results.  Return to the ER for worsening or persistent symptoms, and/or ANY NEW OR CONCERNING SYMPTOMS. If you have issues obtaining follow up, please call: [phone] to obtain a doctor or specialist who takes your insurance in your area.  You may call [phone] to make an appointment with the internal medicine clinic.

## 2021-09-09 NOTE — ED CDU PROVIDER SUBSEQUENT DAY NOTE - HISTORY
CDU Initial Day Note: 29 y/o female, 24 weeks pregnant c/o headache and double vision xx 1 day. pt was at work yesterday and developed b/l headache which radiated to her occiput. Pt admits to feel room spinning dizziness with the headache. Pt states that she tried to eat some food and went home but the symptoms did not resolve. Pt woke up today and still had the symptoms and then she began to have double vision and became very worried. Pt called her GYN who rec to go to the ER and get evaluated. Pt was sent to CDU for MRV and neuro consult.  CDU Sub Day Note: FRANCE Massey, agree w/ above, pt pending read from MRV study, pt feels well, asked to leave before study results, amenable to stay, will update if anything changes. Asymptomatic at this time, no focal deficits.

## 2021-09-09 NOTE — ED CDU PROVIDER DISPOSITION NOTE - CLINICAL COURSE
29 Y/O F 24 weeks pregnant C/O HA and diplopia. Pt was sent to CDU for MRI to eval for venous sinus thrombosis and Neurology reassessment. Testing is neg. LFT's are normal, will D/C with outpatient follow up. 29 Y/O F 24 weeks pregnant C/O HA and diplopia. Pt was sent to CDU for MRI to eval for venous sinus thrombosis and Neurology reassessment. Testing is neg. LFT's are normal, will D/C with outpatient follow up.    Attending MD Morley: Agree with above. Patient currently much improved symptomatically.  Neuro exam: strength and sensation intact in all extremities. CN 2 - 12 intact. No pronator drift. Ambulatory without difficulty. Seen by neurology and cleared for d/c. BP normal while in ED and LFTs normal. Doubt preeclampsia. Cleared for discharge for close follow-up and strict return precautions.

## 2021-09-10 RX ORDER — DIAZEPAM 5 MG
1 TABLET ORAL
Qty: 1 | Refills: 0
Start: 2021-09-10 | End: 2021-09-10

## 2021-10-04 ENCOUNTER — APPOINTMENT (OUTPATIENT)
Dept: OBGYN | Facility: CLINIC | Age: 31
End: 2021-10-04
Payer: COMMERCIAL

## 2021-10-04 VITALS
BODY MASS INDEX: 32.98 KG/M2 | DIASTOLIC BLOOD PRESSURE: 80 MMHG | WEIGHT: 168 LBS | SYSTOLIC BLOOD PRESSURE: 121 MMHG | HEIGHT: 60 IN

## 2021-10-04 DIAGNOSIS — Z83.3 FAMILY HISTORY OF DIABETES MELLITUS: ICD-10-CM

## 2021-10-04 DIAGNOSIS — Z82.49 FAMILY HISTORY OF ISCHEMIC HEART DISEASE AND OTHER DISEASES OF THE CIRCULATORY SYSTEM: ICD-10-CM

## 2021-10-04 DIAGNOSIS — Z87.42 PERSONAL HISTORY OF OTHER DISEASES OF THE FEMALE GENITAL TRACT: ICD-10-CM

## 2021-10-04 DIAGNOSIS — Z01.818 ENCOUNTER FOR OTHER PREPROCEDURAL EXAMINATION: ICD-10-CM

## 2021-10-04 DIAGNOSIS — Z78.9 OTHER SPECIFIED HEALTH STATUS: ICD-10-CM

## 2021-10-04 DIAGNOSIS — Z83.42 FAMILY HISTORY OF FAMILIAL HYPERCHOLESTEROLEMIA: ICD-10-CM

## 2021-10-04 PROCEDURE — 99243 OFF/OP CNSLTJ NEW/EST LOW 30: CPT

## 2021-10-04 RX ORDER — LETROZOLE TABLETS 2.5 MG/1
2.5 TABLET, FILM COATED ORAL
Qty: 10 | Refills: 0 | Status: DISCONTINUED | COMMUNITY
Start: 2019-10-02 | End: 2021-10-04

## 2021-10-04 NOTE — OB HISTORY
[Breech Position] : breech position [Pregnancy History] : patient received anesthesia [[facility]] : [facility] [___] : pregnancy complications occured [FELIX: ___] : FELIX: [unfilled] [EGA: ___ wks] : EGA: [unfilled] wks [Spontaneous] : Spontaneous conception

## 2021-10-04 NOTE — DISCUSSION/SUMMARY
[FreeTextEntry1] : In order to reduce the complications associated with diabetes, the patient was advised to follow dietary advice with the goal of keeping her fasting sugars between 70-90 mg/dL and 1 hour postprandial sugars less than 140 mg/dL.\par \par Diet: The patient was started on a GDM diet based on her body weight and gestational age. The aim of the diet is to gain appropriate weight during pregnancy and not be ketonuric. The diet consists of 3 meals and 3 snacks daily. Each snack will be about 15% of total calories and the patient should have a total carbohydrate intake of about 40%. The patient was advised about the foods to avoid and to monitor her weight regularly.\par \par Exercise: Proper guidelines for exercise and activity were discussed with the patient.\par \par Home Glucose monitoring: The patient was instructed on glucose monitor use using a glucometer. She was advised to check her fasting and post prandial sugars and to record corresponding food intake on the log sheet provided to her today. She was informed that the goal of glucose control was to keep the fasting sugars less than 90 mg/dL and 1 hour post prandial sugars less than 130-140 mg/dL. Signs, symptoms, and management of hypoglycemia were also discussed with her.\par \par Fetal kick counting/fetal movement monitoring, signs and symptoms of preeclampsia,  labor, and fetal surveillance were discussed with her. She was advised to report on any significant change in pattern of fetal movement.\par \par The patient was advised of the need for serial monitoring of fetal growth and for assessment of fetal status. Separate ultrasound reports will be sent to your office. The patient was advised to return in 2 weeks for follow up. In the interim, she was advised to call if there are any problems.

## 2021-10-04 NOTE — HISTORY OF PRESENT ILLNESS
[FreeTextEntry1] : Thank you for referring the patient to us for evaluation and management of gestational diabetes.

## 2021-10-19 ENCOUNTER — APPOINTMENT (OUTPATIENT)
Dept: ANTEPARTUM | Facility: CLINIC | Age: 31
End: 2021-10-19
Payer: COMMERCIAL

## 2021-10-19 VITALS — BODY MASS INDEX: 33.01 KG/M2 | SYSTOLIC BLOOD PRESSURE: 123 MMHG | WEIGHT: 169 LBS | DIASTOLIC BLOOD PRESSURE: 84 MMHG

## 2021-10-19 PROCEDURE — 0502F SUBSEQUENT PRENATAL CARE: CPT

## 2021-11-02 ENCOUNTER — APPOINTMENT (OUTPATIENT)
Dept: ANTEPARTUM | Facility: CLINIC | Age: 31
End: 2021-11-02
Payer: COMMERCIAL

## 2021-11-02 ENCOUNTER — APPOINTMENT (OUTPATIENT)
Dept: OBGYN | Facility: CLINIC | Age: 31
End: 2021-11-02
Payer: COMMERCIAL

## 2021-11-02 VITALS — SYSTOLIC BLOOD PRESSURE: 121 MMHG | DIASTOLIC BLOOD PRESSURE: 84 MMHG | BODY MASS INDEX: 33.59 KG/M2 | WEIGHT: 172 LBS

## 2021-11-02 PROCEDURE — 76816 OB US FOLLOW-UP PER FETUS: CPT

## 2021-11-02 PROCEDURE — 76819 FETAL BIOPHYS PROFIL W/O NST: CPT

## 2021-11-02 PROCEDURE — 0502F SUBSEQUENT PRENATAL CARE: CPT

## 2021-11-02 NOTE — DATA REVIEWED
[FreeTextEntry1] : 31 yr old GDM diet controlled for EFW/BPP\par -FS log reviewed, a few occasional elevations due took choices, (2 bowels of raisin bran, 2 cups of pasta). discussed diet and need to follow portion sizes. \par - EFW 4LBS 13OZ, SUSAN 24, polyhydramnios. BPP8/8\par - discussed importance of diet and exercise, f/u in 2 weeks for repeat scan BPP/NST

## 2021-11-15 ENCOUNTER — APPOINTMENT (OUTPATIENT)
Dept: ANTEPARTUM | Facility: CLINIC | Age: 31
End: 2021-11-15
Payer: COMMERCIAL

## 2021-11-15 ENCOUNTER — APPOINTMENT (OUTPATIENT)
Dept: OBGYN | Facility: CLINIC | Age: 31
End: 2021-11-15
Payer: COMMERCIAL

## 2021-11-15 VITALS — WEIGHT: 174 LBS | SYSTOLIC BLOOD PRESSURE: 126 MMHG | BODY MASS INDEX: 33.98 KG/M2 | DIASTOLIC BLOOD PRESSURE: 88 MMHG

## 2021-11-15 DIAGNOSIS — O24.410 GESTATIONAL DIABETES MELLITUS IN PREGNANCY, DIET CONTROLLED: ICD-10-CM

## 2021-11-15 DIAGNOSIS — Z3A.34 34 WEEKS GESTATION OF PREGNANCY: ICD-10-CM

## 2021-11-15 PROCEDURE — 76818 FETAL BIOPHYS PROFILE W/NST: CPT

## 2021-11-15 PROCEDURE — 99242 OFF/OP CONSLTJ NEW/EST SF 20: CPT

## 2021-11-15 PROCEDURE — 76820 UMBILICAL ARTERY ECHO: CPT

## 2021-11-16 NOTE — HISTORY OF PRESENT ILLNESS
[FreeTextEntry1] : The patient was seen for follow up of her gestational diabetes. She is 34.2 weeks pregnant and is managed by diet alone. The patient has no new complaints and her blood glucose log was reviewed today.\par \par Fasting sugars range < 90 mg/dL.\par Post prandial sugars range < 140 mg/dL.\par \par Patient did not bring her sugar log with her today, but endorses well-controlled glucose readings.\par \par Ultrasound was done and a separate report has been sent to your office.\par Fetal biophysical profile was done and fetal status was reassuring.

## 2021-11-16 NOTE — OB HISTORY
[Breech Position] : breech position [Pregnancy History] : patient received anesthesia [[facility]] : [facility] [___] : pregnancy complications occured [FELIX: ___] : FELIX: [unfilled] [EGA: ___ wks] : EGA: [unfilled] wks [Spontaneous] : Spontaneous conception
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
## 2021-11-18 ENCOUNTER — EMERGENCY (EMERGENCY)
Facility: HOSPITAL | Age: 31
LOS: 1 days | Discharge: STILL ADMITTED OR ADMINISTRATIVE | End: 2021-11-18
Attending: EMERGENCY MEDICINE | Admitting: EMERGENCY MEDICINE
Payer: COMMERCIAL

## 2021-11-18 ENCOUNTER — OUTPATIENT (OUTPATIENT)
Dept: INPATIENT UNIT | Facility: HOSPITAL | Age: 31
LOS: 1 days | Discharge: HOME | End: 2021-11-18
Payer: COMMERCIAL

## 2021-11-18 VITALS
TEMPERATURE: 98 F | SYSTOLIC BLOOD PRESSURE: 148 MMHG | HEART RATE: 99 BPM | HEIGHT: 60 IN | OXYGEN SATURATION: 100 % | RESPIRATION RATE: 18 BRPM | DIASTOLIC BLOOD PRESSURE: 100 MMHG

## 2021-11-18 VITALS
DIASTOLIC BLOOD PRESSURE: 98 MMHG | SYSTOLIC BLOOD PRESSURE: 126 MMHG | HEART RATE: 100 BPM | OXYGEN SATURATION: 100 % | RESPIRATION RATE: 16 BRPM

## 2021-11-18 VITALS
TEMPERATURE: 99 F | SYSTOLIC BLOOD PRESSURE: 137 MMHG | HEART RATE: 102 BPM | RESPIRATION RATE: 16 BRPM | DIASTOLIC BLOOD PRESSURE: 86 MMHG

## 2021-11-18 VITALS — SYSTOLIC BLOOD PRESSURE: 118 MMHG | HEART RATE: 95 BPM | DIASTOLIC BLOOD PRESSURE: 80 MMHG

## 2021-11-18 DIAGNOSIS — O26.899 OTHER SPECIFIED PREGNANCY RELATED CONDITIONS, UNSPECIFIED TRIMESTER: ICD-10-CM

## 2021-11-18 DIAGNOSIS — Z98.890 OTHER SPECIFIED POSTPROCEDURAL STATES: Chronic | ICD-10-CM

## 2021-11-18 DIAGNOSIS — Z98.891 HISTORY OF UTERINE SCAR FROM PREVIOUS SURGERY: Chronic | ICD-10-CM

## 2021-11-18 DIAGNOSIS — Z3A.00 WEEKS OF GESTATION OF PREGNANCY NOT SPECIFIED: ICD-10-CM

## 2021-11-18 LAB
ALBUMIN SERPL ELPH-MCNC: 3.2 G/DL — LOW (ref 3.3–5)
ALP SERPL-CCNC: 115 U/L — SIGNIFICANT CHANGE UP (ref 40–120)
ALT FLD-CCNC: 8 U/L — SIGNIFICANT CHANGE UP (ref 4–33)
ANION GAP SERPL CALC-SCNC: 15 MMOL/L — HIGH (ref 7–14)
APPEARANCE UR: CLEAR — SIGNIFICANT CHANGE UP
APPEARANCE UR: CLEAR — SIGNIFICANT CHANGE UP
APTT BLD: 35.4 SEC — SIGNIFICANT CHANGE UP (ref 27–36.3)
AST SERPL-CCNC: 16 U/L — SIGNIFICANT CHANGE UP (ref 4–32)
BASOPHILS # BLD AUTO: 0.03 K/UL — SIGNIFICANT CHANGE UP (ref 0–0.2)
BASOPHILS NFR BLD AUTO: 0.2 % — SIGNIFICANT CHANGE UP (ref 0–2)
BILIRUB SERPL-MCNC: 0.3 MG/DL — SIGNIFICANT CHANGE UP (ref 0.2–1.2)
BILIRUB UR-MCNC: NEGATIVE — SIGNIFICANT CHANGE UP
BILIRUB UR-MCNC: NEGATIVE — SIGNIFICANT CHANGE UP
BUN SERPL-MCNC: 6 MG/DL — LOW (ref 7–23)
CALCIUM SERPL-MCNC: 9.1 MG/DL — SIGNIFICANT CHANGE UP (ref 8.4–10.5)
CHLORIDE SERPL-SCNC: 103 MMOL/L — SIGNIFICANT CHANGE UP (ref 98–107)
CO2 SERPL-SCNC: 18 MMOL/L — LOW (ref 22–31)
COLOR SPEC: SIGNIFICANT CHANGE UP
COLOR SPEC: SIGNIFICANT CHANGE UP
CREAT ?TM UR-MCNC: 45 MG/DL — SIGNIFICANT CHANGE UP
CREAT ?TM UR-MCNC: 72 MG/DL — SIGNIFICANT CHANGE UP
CREAT SERPL-MCNC: 0.44 MG/DL — LOW (ref 0.5–1.3)
DIFF PNL FLD: NEGATIVE — SIGNIFICANT CHANGE UP
DIFF PNL FLD: NEGATIVE — SIGNIFICANT CHANGE UP
EOSINOPHIL # BLD AUTO: 0.06 K/UL — SIGNIFICANT CHANGE UP (ref 0–0.5)
EOSINOPHIL NFR BLD AUTO: 0.4 % — SIGNIFICANT CHANGE UP (ref 0–6)
FIBRINOGEN PPP-MCNC: 533 MG/DL — HIGH (ref 290–520)
GLUCOSE SERPL-MCNC: 82 MG/DL — SIGNIFICANT CHANGE UP (ref 70–99)
GLUCOSE UR QL: NEGATIVE — SIGNIFICANT CHANGE UP
GLUCOSE UR QL: NEGATIVE — SIGNIFICANT CHANGE UP
HCG SERPL-ACNC: SIGNIFICANT CHANGE UP MIU/ML
HCT VFR BLD CALC: 37 % — SIGNIFICANT CHANGE UP (ref 34.5–45)
HGB BLD-MCNC: 12.5 G/DL — SIGNIFICANT CHANGE UP (ref 11.5–15.5)
IANC: 11.15 K/UL — HIGH (ref 1.5–8.5)
IMM GRANULOCYTES NFR BLD AUTO: 0.6 % — SIGNIFICANT CHANGE UP (ref 0–1.5)
INR BLD: 1.1 RATIO — SIGNIFICANT CHANGE UP (ref 0.88–1.16)
KETONES UR-MCNC: ABNORMAL
KETONES UR-MCNC: ABNORMAL
LDH SERPL L TO P-CCNC: 205 U/L — SIGNIFICANT CHANGE UP (ref 135–225)
LEUKOCYTE ESTERASE UR-ACNC: NEGATIVE — SIGNIFICANT CHANGE UP
LEUKOCYTE ESTERASE UR-ACNC: NEGATIVE — SIGNIFICANT CHANGE UP
LYMPHOCYTES # BLD AUTO: 14.2 % — SIGNIFICANT CHANGE UP (ref 13–44)
LYMPHOCYTES # BLD AUTO: 2.03 K/UL — SIGNIFICANT CHANGE UP (ref 1–3.3)
MAGNESIUM SERPL-MCNC: 1.5 MG/DL — LOW (ref 1.6–2.6)
MCHC RBC-ENTMCNC: 31.3 PG — SIGNIFICANT CHANGE UP (ref 27–34)
MCHC RBC-ENTMCNC: 33.8 GM/DL — SIGNIFICANT CHANGE UP (ref 32–36)
MCV RBC AUTO: 92.5 FL — SIGNIFICANT CHANGE UP (ref 80–100)
MONOCYTES # BLD AUTO: 0.91 K/UL — HIGH (ref 0–0.9)
MONOCYTES NFR BLD AUTO: 6.4 % — SIGNIFICANT CHANGE UP (ref 2–14)
NEUTROPHILS # BLD AUTO: 11.15 K/UL — HIGH (ref 1.8–7.4)
NEUTROPHILS NFR BLD AUTO: 78.2 % — HIGH (ref 43–77)
NITRITE UR-MCNC: NEGATIVE — SIGNIFICANT CHANGE UP
NITRITE UR-MCNC: NEGATIVE — SIGNIFICANT CHANGE UP
NRBC # BLD: 0 /100 WBCS — SIGNIFICANT CHANGE UP
NRBC # FLD: 0 K/UL — SIGNIFICANT CHANGE UP
PH UR: 7 — SIGNIFICANT CHANGE UP (ref 5–8)
PH UR: 7.5 — SIGNIFICANT CHANGE UP (ref 5–8)
PHOSPHATE SERPL-MCNC: 4.3 MG/DL — SIGNIFICANT CHANGE UP (ref 2.5–4.5)
PLATELET # BLD AUTO: 243 K/UL — SIGNIFICANT CHANGE UP (ref 150–400)
POTASSIUM SERPL-MCNC: 4.4 MMOL/L — SIGNIFICANT CHANGE UP (ref 3.5–5.3)
POTASSIUM SERPL-SCNC: 4.4 MMOL/L — SIGNIFICANT CHANGE UP (ref 3.5–5.3)
PROT ?TM UR-MCNC: 10 MG/DL — SIGNIFICANT CHANGE UP
PROT ?TM UR-MCNC: 10 MG/DL — SIGNIFICANT CHANGE UP
PROT ?TM UR-MCNC: 6 MG/DL — SIGNIFICANT CHANGE UP
PROT ?TM UR-MCNC: 6 MG/DL — SIGNIFICANT CHANGE UP
PROT SERPL-MCNC: 6.2 G/DL — SIGNIFICANT CHANGE UP (ref 6–8.3)
PROT UR-MCNC: NEGATIVE — SIGNIFICANT CHANGE UP
PROT UR-MCNC: NEGATIVE — SIGNIFICANT CHANGE UP
PROT/CREAT UR-RTO: 0.1 RATIO — SIGNIFICANT CHANGE UP (ref 0–0.2)
PROT/CREAT UR-RTO: 0.1 RATIO — SIGNIFICANT CHANGE UP (ref 0–0.2)
PROTHROM AB SERPL-ACNC: 12.5 SEC — SIGNIFICANT CHANGE UP (ref 10.6–13.6)
RBC # BLD: 4 M/UL — SIGNIFICANT CHANGE UP (ref 3.8–5.2)
RBC # FLD: 14.3 % — SIGNIFICANT CHANGE UP (ref 10.3–14.5)
SODIUM SERPL-SCNC: 136 MMOL/L — SIGNIFICANT CHANGE UP (ref 135–145)
SP GR SPEC: 1.01 — SIGNIFICANT CHANGE UP (ref 1–1.05)
SP GR SPEC: 1.02 — SIGNIFICANT CHANGE UP (ref 1–1.05)
URATE SERPL-MCNC: 4.6 MG/DL — SIGNIFICANT CHANGE UP (ref 2.5–7)
UROBILINOGEN FLD QL: SIGNIFICANT CHANGE UP
UROBILINOGEN FLD QL: SIGNIFICANT CHANGE UP
WBC # BLD: 14.26 K/UL — HIGH (ref 3.8–10.5)
WBC # FLD AUTO: 14.26 K/UL — HIGH (ref 3.8–10.5)

## 2021-11-18 PROCEDURE — 99285 EMERGENCY DEPT VISIT HI MDM: CPT

## 2021-11-18 PROCEDURE — 76818 FETAL BIOPHYS PROFILE W/NST: CPT | Mod: 26

## 2021-11-18 PROCEDURE — 99213 OFFICE O/P EST LOW 20 MIN: CPT

## 2021-11-18 RX ORDER — ACETAMINOPHEN 500 MG
1000 TABLET ORAL ONCE
Refills: 0 | Status: COMPLETED | OUTPATIENT
Start: 2021-11-18 | End: 2021-11-18

## 2021-11-18 RX ORDER — METOCLOPRAMIDE HCL 10 MG
10 TABLET ORAL ONCE
Refills: 0 | Status: COMPLETED | OUTPATIENT
Start: 2021-11-18 | End: 2021-11-18

## 2021-11-18 RX ORDER — LABETALOL HCL 100 MG
10 TABLET ORAL ONCE
Refills: 0 | Status: COMPLETED | OUTPATIENT
Start: 2021-11-18 | End: 2021-11-18

## 2021-11-18 RX ADMIN — Medication 400 MILLIGRAM(S): at 08:32

## 2021-11-18 RX ADMIN — Medication 1000 MILLIGRAM(S): at 09:15

## 2021-11-18 RX ADMIN — Medication 10 MILLIGRAM(S): at 06:44

## 2021-11-18 RX ADMIN — Medication 10 MILLIGRAM(S): at 08:33

## 2021-11-18 NOTE — ED PROVIDER NOTE - PROGRESS NOTE DETAILS
pt signed out to me pending labs and GYn eval, GYN called back and states to send pt directly up to l&d, no further workup in ED, signed out to gyn resudent and l and d triage

## 2021-11-18 NOTE — OB PROVIDER TRIAGE NOTE - NSOBPROVIDERNOTE_OBGYN_ALL_OB_FT
Discussed findings with Dr Shepard: 0950:  Patient is s/p Reglan and Tylenol.  Now reports no nausea and a decrease in pain from headache from 10/10 to 2/10.  Labs wnl  Next pn appt is on Tue - 11/23/21. & Sat 11/220 for fluid check        Discussed findings with Dr. Pabon.  Plan:  Advised to f/u as scheduled. 0950:  Patient is s/p Reglan and Tylenol.  Now reports no nausea and a decrease in pain from headache from 10/10 to 2/10.  Labs wnl  Next pn appt is on Tue - 11/23/21. & Sat 11/220 for fluid check        Discussed findings with Dr. Pabon  Plan:  patient is cleared for discharge home.    Cleared for discharge home with a 24hr urine collection.  Monitor BP s at home as instructed.  Report s/s as discussed.   Advised to f/u in 2 dys with Dr Dove

## 2021-11-18 NOTE — ED ADULT TRIAGE NOTE - CHIEF COMPLAINT QUOTE
~ 35 weeks pregnant, FELIX , : c/o HA, dizziness, nausea, b/l arm pain x 2 days + paresthesias, atraumatic, gestational diabetic--  ~ 35 weeks pregnant, FELIX , : c/o HA, dizziness, nausea, b/l arm pain x 2 days + paresthesias, atraumatic, gestational diabetic--   as per L&D pt to be seen in main ED ~ 35 weeks pregnant, FELIX , : c/o HA (no visual changes), dizziness, nausea, b/l arm pain x 2 days + paresthesias, atraumatic, gestational diabetic--   as per L&D pt to be seen in main ED

## 2021-11-18 NOTE — OB PROVIDER TRIAGE NOTE - HISTORY OF PRESENT ILLNESS
PNC Provider:  Dr. Dove  EDC:  12/25/2021.  Patient is a 30y/o G 3  @ 34 5/7wks gestation who reports to triage, from the ED with c/o headache, pain of 10/10. nausea and photophobia x 2 dys.   Patient was intiall seen and evaluated.  S/P Labetalol 10mg ivp.  Patient's headache, photophobia and nausea persists.  Denies emesis, ruq/epigastric pain at this time.   Denies ctx, lof, or vaginal bleeding.  Reports good fetal movements.  AP Course:  GDMA1  Meds:  pnv. asa 81mg & iron  Allergies:  pcn, ceftriaxone- hives, cipro, erythromycin - nausea/diarrhea and benadryl - anaphylaxis.  Scheduled repeat C/S -12/20/2021.   Last ate at 0830 - soup  Denies prior covid-19 infection; fully vaccinated.    PMHx/SH/Psych - denies  PSHx - C/S  GYN - abnormal pap - 8yrs ago; rep wnl as per patient  OBHx - C/S for breech presentation - 11/17/2016; nuchal cord           - Missed ab with d&c - 11/2020

## 2021-11-18 NOTE — ED PROVIDER NOTE - PHYSICAL EXAMINATION
VITALS:   T(C): 36.7 (11-18-21 @ 04:57), Max: 36.7 (11-18-21 @ 04:57)  HR: 99 (11-18-21 @ 04:57) (99 - 99)  BP: 148/100 (11-18-21 @ 04:57) (148/100 - 148/100)  RR: 18 (11-18-21 @ 04:57) (18 - 18)  SpO2: 100% (11-18-21 @ 04:57) (100% - 100%)    PHYSICAL EXAM:   General: NAD; awake, sitting in bed comfortably  HEENT: nc/at, clear conjunctiva, moist mucous membranes  Neck: supple, no JVD  Heart: RRR; no murmurs, rubs, or gallops  Chest/Lung: clear to auscultation bilaterally; no wheezes, rales, or rhonchi  Abdomen: soft, gravid, non-tender; no guarding or rebound; bowel sounds present  Extremities: minimal edema to bilateral feet and hands; no erythema or tenderness to palpation  Skin: clear, dry  Neuro: A&Ox3; no focal deficits, grossly intact

## 2021-11-18 NOTE — ED PROVIDER NOTE - ATTENDING CONTRIBUTION TO CARE
I performed a face-to-face evaluation of the patient and performed a history and physical examination along with the resident or ACP, and/or medical student above.  I agree with the history and physical examination as documented by the resident or ACP, and/or medical student above.  [place]:  32yo F, , at approx 35wks gestation, w/ pmh as above including HELLP syndrome, p/w headache (L temporal) associated w/ lightheadedness, general weakness, nausea; /100 in triage. FHR is 149 on fetal doppler.  ECG, htn in pregnancy labs, labetalol, OB consult.

## 2021-11-18 NOTE — OB PROVIDER TRIAGE NOTE - NSHPPHYSICALEXAM_GEN_ALL_CORE
Vital Signs Last 24 Hrs  T(C): 37.4 (18 Nov 2021 08:08), Max: 37.4 (18 Nov 2021 08:08)  T(F): 99.3 (18 Nov 2021 08:08), Max: 99.3 (18 Nov 2021 08:08)  HR: 99 (18 Nov 2021 08:51) (97 - 102)  BP: 117/82 (18 Nov 2021 08:41) (116/84 - 148/100)  RR: 16 (18 Nov 2021 08:08) (15 - 18)  SpO2: 100% (18 Nov 2021 07:03) (100% - 100%)    Abdomen gravid, soft and nontender  Cont EFM  SVE - deferred  Orders:  UA, RCR, Tylenol, Reglan.  S/P CBC, CMP, Coags in the ED. Vital Signs Last 24 Hrs  T(C): 37.4 (18 Nov 2021 08:08), Max: 37.4 (18 Nov 2021 08:08)  T(F): 99.3 (18 Nov 2021 08:08), Max: 99.3 (18 Nov 2021 08:08)  HR: 99 (18 Nov 2021 08:51) (97 - 102)  BP: 117/82 (18 Nov 2021 08:41) (116/84 - 148/100)  RR: 16 (18 Nov 2021 08:08) (15 - 18)  SpO2: 100% (18 Nov 2021 07:03) (100% - 100%)    Abdomen gravid, soft and nontender  TAS - vtx/ant plac/leonardo 8.14  BPP - 8/8  Cont EFM  SVE - deferred  Orders:  UA, RCR, Tylenol, Reglan.  S/P CBC, CMP, Coags in the ED.

## 2021-11-18 NOTE — OB PROVIDER TRIAGE NOTE - NS_CHIEFCOMPLAINTOTHER_OBGYN_ALL_OB_FT
elevated /100 in ER this AM was evaluated in ER given labetalol 20 mg IVP x's 1 c/o headache and nausea since 0330 10/10 EMTALA :  0750 129/88 102 0800 134/79 103

## 2021-11-18 NOTE — ED ADULT NURSE NOTE - CHIEF COMPLAINT QUOTE
~ 35 weeks pregnant, FELIX , : c/o HA, dizziness, nausea, b/l arm pain x 2 days + paresthesias, atraumatic, gestational diabetic--

## 2021-11-18 NOTE — OB RN TRIAGE NOTE - DOMESTIC TRAVEL HIGH RISK QUESTION
Size Of Lesion In Cm: 0.8 No Z Plasty Text: The lesion was extirpated to the level of the fat with a #15 scalpel blade.  Given the location of the defect, shape of the defect and the proximity to free margins a Z-plasty was deemed most appropriate for repair.  Using a sterile surgical marker, the appropriate transposition arms of the Z-plasty were drawn incorporating the defect and placing the expected incisions within the relaxed skin tension lines where possible.    The area thus outlined was incised deep to adipose tissue with a #15 scalpel blade.  The skin margins were undermined to an appropriate distance in all directions utilizing iris scissors.  The opposing transposition arms were then transposed into place in opposite direction and anchored with interrupted buried subcutaneous sutures.

## 2021-11-18 NOTE — ED PROVIDER NOTE - OBJECTIVE STATEMENT
The patient is a 31 year old female w/ PMHx of HELLP syndrome, /A1 p/w headache. The patient is currently at 35 weeks gestation, w/ FELIX on . She reports not feeling well since yesterday, with a left temporal headache, light-headed dizziness, generalized weakness, and nausea, as well as shooting pain behind both shoulders radiating down her upper extremities since around 03:00 this morning. The patient notes being diagnosed with HELLP syndrome shortly following the birth of her first child. BP was elevated at 148/100 in triage. Patient denies chest pain The patient is a 31 year old female w/ PMHx of HELLP syndrome, /A1 p/w headache. The patient is currently at 35 weeks gestation, w/ FELIX on . She reports not feeling well since yesterday, with a left temporal headache, light-headed dizziness, generalized weakness, and nausea, as well as shooting pain behind both shoulders radiating down her upper extremities since around 03:00 this morning. The patient notes being diagnosed with HELLP syndrome shortly following the birth of her first child. BP was elevated at 148/100 in triage. Patient denies chest pain, SOB, diaphoresis, abdominal pain, vomiting, diarrhea, dysuria, hematuria, vaginal bleeding, or fever. The patient is a 31 year old female w/ PMHx of HELLP syndrome, /A1 p/w headache. The patient is currently at 35 weeks gestation, w/ FELIX on . She reports not feeling well since yesterday, with a left temporal headache, light-headed dizziness, generalized weakness, and nausea, as well as shooting pain behind both shoulders radiating down her upper extremities since around 03:00 this morning. The patient notes being diagnosed with HELLP syndrome shortly following the birth of her first child. BP was elevated at 148/100 in triage. Patient also c/o swelling to bilateral hands and feet. Denies chest pain, SOB, cough, diaphoresis, abdominal pain, vomiting, diarrhea, dysuria, hematuria, vaginal bleeding, or fever.

## 2021-11-18 NOTE — ED ADULT NURSE NOTE - OBJECTIVE STATEMENT
32 y/o female, a&ox4, ambulatory, 35 weeks pregnant, received to rm 15 with c/o headache, bilateral arm, N/V starting earlier tonight. Pt denies taking any medications. No signs of deformities and skin intact. Pt reports pmh of HELLP during last pregnancy, and gestational diabetes. Respirations are even and unlabored, with no signs of respiratory distress. Vs noted. 20G IV placed to right AC. Labs collected and sent off.

## 2021-11-18 NOTE — OB PROVIDER TRIAGE NOTE - NS_OBGYNHISTORY_OBGYN_ALL_OB_FT
OBHx - C/S for breech presentation - 11/17/2016; nuchal cord           - Missed ab with d&c - 11/2020  GYN - abnormal pap - 8yrs ago; rep wnl as per patient

## 2021-11-18 NOTE — OB PROVIDER TRIAGE NOTE - ADDITIONAL INSTRUCTIONS
Discussed findings with Dr. Pabon  Plan:  patient is cleared for discharge home.    Cleared for discharge home with a 24hr urine collection.  Monitor BP s at home as instructed.  Report s/s as discussed.   Advised to f/u in 2 dys with Dr Dove

## 2021-11-18 NOTE — ED PROVIDER NOTE - CLINICAL SUMMARY MEDICAL DECISION MAKING FREE TEXT BOX
31 year old female w/ PMHx of HELLP syndrome, /A1 p/w headache, dizziness, generalized weakness. BP in triage 148/100. Ordered HTN in pregnancy order set. Given IV labetalol. OB/GYN consulted. 31 year old female w/ PMHx of HELLP syndrome, /A1 p/w headache, dizziness, generalized weakness. BP in triage 148/100. Fetal heart rate 149 bpm. Ordered HTN in pregnancy order set. Patient given IV labetalol. OB/GYN consulted.

## 2021-11-20 ENCOUNTER — APPOINTMENT (OUTPATIENT)
Dept: ANTEPARTUM | Facility: CLINIC | Age: 31
End: 2021-11-20

## 2021-11-20 ENCOUNTER — APPOINTMENT (OUTPATIENT)
Dept: OBGYN | Facility: CLINIC | Age: 31
End: 2021-11-20

## 2021-11-20 VITALS — WEIGHT: 169 LBS | BODY MASS INDEX: 33.01 KG/M2 | SYSTOLIC BLOOD PRESSURE: 135 MMHG | DIASTOLIC BLOOD PRESSURE: 91 MMHG

## 2021-11-24 ENCOUNTER — APPOINTMENT (OUTPATIENT)
Dept: ANTEPARTUM | Facility: CLINIC | Age: 31
End: 2021-11-24
Payer: COMMERCIAL

## 2021-11-24 ENCOUNTER — APPOINTMENT (OUTPATIENT)
Dept: OBGYN | Facility: CLINIC | Age: 31
End: 2021-11-24
Payer: COMMERCIAL

## 2021-11-24 ENCOUNTER — OUTPATIENT (OUTPATIENT)
Dept: INPATIENT UNIT | Facility: HOSPITAL | Age: 31
LOS: 1 days | Discharge: HOME | End: 2021-11-24
Payer: COMMERCIAL

## 2021-11-24 VITALS — OXYGEN SATURATION: 96 % | HEART RATE: 105 BPM

## 2021-11-24 VITALS
SYSTOLIC BLOOD PRESSURE: 127 MMHG | DIASTOLIC BLOOD PRESSURE: 90 MMHG | HEART RATE: 120 BPM | TEMPERATURE: 98 F | RESPIRATION RATE: 16 BRPM

## 2021-11-24 DIAGNOSIS — O26.899 OTHER SPECIFIED PREGNANCY RELATED CONDITIONS, UNSPECIFIED TRIMESTER: ICD-10-CM

## 2021-11-24 DIAGNOSIS — Z98.891 HISTORY OF UTERINE SCAR FROM PREVIOUS SURGERY: Chronic | ICD-10-CM

## 2021-11-24 DIAGNOSIS — Z98.890 OTHER SPECIFIED POSTPROCEDURAL STATES: Chronic | ICD-10-CM

## 2021-11-24 DIAGNOSIS — Z3A.00 WEEKS OF GESTATION OF PREGNANCY NOT SPECIFIED: ICD-10-CM

## 2021-11-24 LAB
ALBUMIN SERPL ELPH-MCNC: 3.2 G/DL — LOW (ref 3.3–5)
ALP SERPL-CCNC: 131 U/L — HIGH (ref 40–120)
ALT FLD-CCNC: 7 U/L — SIGNIFICANT CHANGE UP (ref 4–33)
ANION GAP SERPL CALC-SCNC: 11 MMOL/L — SIGNIFICANT CHANGE UP (ref 7–14)
APPEARANCE UR: CLEAR — SIGNIFICANT CHANGE UP
APTT BLD: 31.1 SEC — SIGNIFICANT CHANGE UP (ref 27–36.3)
AST SERPL-CCNC: 10 U/L — SIGNIFICANT CHANGE UP (ref 4–32)
B PERT DNA SPEC QL NAA+PROBE: SIGNIFICANT CHANGE UP
B PERT+PARAPERT DNA PNL SPEC NAA+PROBE: SIGNIFICANT CHANGE UP
BACTERIA # UR AUTO: NEGATIVE — SIGNIFICANT CHANGE UP
BASOPHILS # BLD AUTO: 0.02 K/UL — SIGNIFICANT CHANGE UP (ref 0–0.2)
BASOPHILS NFR BLD AUTO: 0.2 % — SIGNIFICANT CHANGE UP (ref 0–2)
BILIRUB SERPL-MCNC: 0.3 MG/DL — SIGNIFICANT CHANGE UP (ref 0.2–1.2)
BILIRUB UR-MCNC: NEGATIVE — SIGNIFICANT CHANGE UP
BORDETELLA PARAPERTUSSIS (RAPRVP): SIGNIFICANT CHANGE UP
BUN SERPL-MCNC: 6 MG/DL — LOW (ref 7–23)
C PNEUM DNA SPEC QL NAA+PROBE: SIGNIFICANT CHANGE UP
CALCIUM SERPL-MCNC: 9.1 MG/DL — SIGNIFICANT CHANGE UP (ref 8.4–10.5)
CHLORIDE SERPL-SCNC: 101 MMOL/L — SIGNIFICANT CHANGE UP (ref 98–107)
CO2 SERPL-SCNC: 22 MMOL/L — SIGNIFICANT CHANGE UP (ref 22–31)
COLOR SPEC: YELLOW — SIGNIFICANT CHANGE UP
CREAT ?TM UR-MCNC: 187 MG/DL — SIGNIFICANT CHANGE UP
CREAT SERPL-MCNC: 0.51 MG/DL — SIGNIFICANT CHANGE UP (ref 0.5–1.3)
DIFF PNL FLD: NEGATIVE — SIGNIFICANT CHANGE UP
EOSINOPHIL # BLD AUTO: 0.01 K/UL — SIGNIFICANT CHANGE UP (ref 0–0.5)
EOSINOPHIL NFR BLD AUTO: 0.1 % — SIGNIFICANT CHANGE UP (ref 0–6)
EPI CELLS # UR: 2 /HPF — SIGNIFICANT CHANGE UP (ref 0–5)
FIBRINOGEN PPP-MCNC: 598 MG/DL — HIGH (ref 290–520)
FLUAV SUBTYP SPEC NAA+PROBE: SIGNIFICANT CHANGE UP
FLUBV RNA SPEC QL NAA+PROBE: SIGNIFICANT CHANGE UP
GLUCOSE SERPL-MCNC: 101 MG/DL — HIGH (ref 70–99)
GLUCOSE UR QL: NEGATIVE — SIGNIFICANT CHANGE UP
HADV DNA SPEC QL NAA+PROBE: SIGNIFICANT CHANGE UP
HCOV 229E RNA SPEC QL NAA+PROBE: SIGNIFICANT CHANGE UP
HCOV HKU1 RNA SPEC QL NAA+PROBE: SIGNIFICANT CHANGE UP
HCOV NL63 RNA SPEC QL NAA+PROBE: SIGNIFICANT CHANGE UP
HCOV OC43 RNA SPEC QL NAA+PROBE: SIGNIFICANT CHANGE UP
HCT VFR BLD CALC: 38.6 % — SIGNIFICANT CHANGE UP (ref 34.5–45)
HGB BLD-MCNC: 12.8 G/DL — SIGNIFICANT CHANGE UP (ref 11.5–15.5)
HMPV RNA SPEC QL NAA+PROBE: SIGNIFICANT CHANGE UP
HPIV1 RNA SPEC QL NAA+PROBE: SIGNIFICANT CHANGE UP
HPIV2 RNA SPEC QL NAA+PROBE: SIGNIFICANT CHANGE UP
HPIV3 RNA SPEC QL NAA+PROBE: SIGNIFICANT CHANGE UP
HPIV4 RNA SPEC QL NAA+PROBE: SIGNIFICANT CHANGE UP
IANC: 9.68 K/UL — HIGH (ref 1.5–8.5)
IMM GRANULOCYTES NFR BLD AUTO: 0.4 % — SIGNIFICANT CHANGE UP (ref 0–1.5)
INR BLD: 1.08 RATIO — SIGNIFICANT CHANGE UP (ref 0.88–1.16)
KETONES UR-MCNC: ABNORMAL
LDH SERPL L TO P-CCNC: 129 U/L — LOW (ref 135–225)
LEUKOCYTE ESTERASE UR-ACNC: NEGATIVE — SIGNIFICANT CHANGE UP
LYMPHOCYTES # BLD AUTO: 1.05 K/UL — SIGNIFICANT CHANGE UP (ref 1–3.3)
LYMPHOCYTES # BLD AUTO: 9.2 % — LOW (ref 13–44)
M PNEUMO DNA SPEC QL NAA+PROBE: SIGNIFICANT CHANGE UP
MCHC RBC-ENTMCNC: 30.3 PG — SIGNIFICANT CHANGE UP (ref 27–34)
MCHC RBC-ENTMCNC: 33.2 GM/DL — SIGNIFICANT CHANGE UP (ref 32–36)
MCV RBC AUTO: 91.3 FL — SIGNIFICANT CHANGE UP (ref 80–100)
MONOCYTES # BLD AUTO: 0.58 K/UL — SIGNIFICANT CHANGE UP (ref 0–0.9)
MONOCYTES NFR BLD AUTO: 5.1 % — SIGNIFICANT CHANGE UP (ref 2–14)
NEUTROPHILS # BLD AUTO: 9.68 K/UL — HIGH (ref 1.8–7.4)
NEUTROPHILS NFR BLD AUTO: 85 % — HIGH (ref 43–77)
NITRITE UR-MCNC: NEGATIVE — SIGNIFICANT CHANGE UP
NRBC # BLD: 0 /100 WBCS — SIGNIFICANT CHANGE UP
NRBC # FLD: 0 K/UL — SIGNIFICANT CHANGE UP
PH UR: 6.5 — SIGNIFICANT CHANGE UP (ref 5–8)
PLATELET # BLD AUTO: 209 K/UL — SIGNIFICANT CHANGE UP (ref 150–400)
POTASSIUM SERPL-MCNC: 4.1 MMOL/L — SIGNIFICANT CHANGE UP (ref 3.5–5.3)
POTASSIUM SERPL-SCNC: 4.1 MMOL/L — SIGNIFICANT CHANGE UP (ref 3.5–5.3)
PROT ?TM UR-MCNC: 26 MG/DL — SIGNIFICANT CHANGE UP
PROT ?TM UR-MCNC: 26 MG/DL — SIGNIFICANT CHANGE UP
PROT SERPL-MCNC: 6.3 G/DL — SIGNIFICANT CHANGE UP (ref 6–8.3)
PROT UR-MCNC: ABNORMAL
PROT/CREAT UR-RTO: 0.1 RATIO — SIGNIFICANT CHANGE UP (ref 0–0.2)
PROTHROM AB SERPL-ACNC: 12.3 SEC — SIGNIFICANT CHANGE UP (ref 10.6–13.6)
RAPID RVP RESULT: SIGNIFICANT CHANGE UP
RBC # BLD: 4.23 M/UL — SIGNIFICANT CHANGE UP (ref 3.8–5.2)
RBC # FLD: 13.8 % — SIGNIFICANT CHANGE UP (ref 10.3–14.5)
RBC CASTS # UR COMP ASSIST: 2 /HPF — SIGNIFICANT CHANGE UP (ref 0–4)
RSV RNA SPEC QL NAA+PROBE: SIGNIFICANT CHANGE UP
RV+EV RNA SPEC QL NAA+PROBE: SIGNIFICANT CHANGE UP
SARS-COV-2 RNA SPEC QL NAA+PROBE: SIGNIFICANT CHANGE UP
SODIUM SERPL-SCNC: 134 MMOL/L — LOW (ref 135–145)
SP GR SPEC: 1.02 — SIGNIFICANT CHANGE UP (ref 1–1.05)
URATE SERPL-MCNC: 6.5 MG/DL — SIGNIFICANT CHANGE UP (ref 2.5–7)
UROBILINOGEN FLD QL: ABNORMAL
WBC # BLD: 11.38 K/UL — HIGH (ref 3.8–10.5)
WBC # FLD AUTO: 11.38 K/UL — HIGH (ref 3.8–10.5)
WBC UR QL: 2 /HPF — SIGNIFICANT CHANGE UP (ref 0–5)

## 2021-11-24 PROCEDURE — 76818 FETAL BIOPHYS PROFILE W/NST: CPT | Mod: 26

## 2021-11-24 PROCEDURE — 93010 ELECTROCARDIOGRAM REPORT: CPT

## 2021-11-24 PROCEDURE — 76818 FETAL BIOPHYS PROFILE W/NST: CPT

## 2021-11-24 PROCEDURE — 99214 OFFICE O/P EST MOD 30 MIN: CPT | Mod: 25

## 2021-11-24 PROCEDURE — 76816 OB US FOLLOW-UP PER FETUS: CPT | Mod: 26

## 2021-11-24 PROCEDURE — 0502F SUBSEQUENT PRENATAL CARE: CPT

## 2021-11-24 RX ORDER — METOCLOPRAMIDE HCL 10 MG
10 TABLET ORAL ONCE
Refills: 0 | Status: COMPLETED | OUTPATIENT
Start: 2021-11-24 | End: 2021-11-24

## 2021-11-24 RX ORDER — SODIUM CHLORIDE 9 MG/ML
3 INJECTION INTRAMUSCULAR; INTRAVENOUS; SUBCUTANEOUS EVERY 8 HOURS
Refills: 0 | Status: DISCONTINUED | OUTPATIENT
Start: 2021-11-24 | End: 2021-12-09

## 2021-11-24 RX ORDER — ACETAMINOPHEN 500 MG
975 TABLET ORAL ONCE
Refills: 0 | Status: COMPLETED | OUTPATIENT
Start: 2021-11-24 | End: 2021-11-24

## 2021-11-24 RX ORDER — SODIUM CHLORIDE 9 MG/ML
500 INJECTION, SOLUTION INTRAVENOUS ONCE
Refills: 0 | Status: COMPLETED | OUTPATIENT
Start: 2021-11-24 | End: 2021-11-24

## 2021-11-24 RX ADMIN — SODIUM CHLORIDE 1000 MILLILITER(S): 9 INJECTION, SOLUTION INTRAVENOUS at 20:32

## 2021-11-24 RX ADMIN — Medication 10 MILLIGRAM(S): at 20:35

## 2021-11-24 RX ADMIN — Medication 975 MILLIGRAM(S): at 22:28

## 2021-11-24 RX ADMIN — Medication 975 MILLIGRAM(S): at 20:33

## 2021-11-24 NOTE — OB PROVIDER TRIAGE NOTE - NSHPPHYSICALEXAM_GEN_ALL_CORE
Ls clear bilaterally  CV RRR sinus tachy  TAS:  breech  anterior placenta  SUSAN: 10.5  EFW:2459g/5#7  BPP 8/8  FHT: moderate variability, baseline 155, + accelerations, + variables  toco: uterine irritability  Vital Signs Last 24 Hrs  T(C): 37.7 (24 Nov 2021 20:33), Max: 37.7 (24 Nov 2021 20:33)  T(F): 99.86 (24 Nov 2021 20:33), Max: 99.86 (24 Nov 2021 20:33)  HR: 104 (24 Nov 2021 21:58) (100 - 135)  BP: 121/77 (24 Nov 2021 21:43) (119/77 - 134/92)  BP(mean): --  RR: 16 (24 Nov 2021 18:48) (16 - 16)  SpO2: 98% (24 Nov 2021 21:12) (96% - 99%)

## 2021-11-24 NOTE — OB PROVIDER TRIAGE NOTE - PLAN OF CARE
d/w Dr Whittington d/c home no evidence of PEC @ 35.4 wks  maternal and fetal surveillance reassuring  rest activity as tolerated  increase water intake   labor precautions instructed  continue fetal kick counts  continue taking blood sugars  continue taking blood pressures 2 x day call MD if BP >140/90  may take tylenol if needed for pain  return for vaginal bleeding leakage of fluid decreased fetal movement or any concerns  v/w discharge instructions given with verbal understanding by patient

## 2021-11-24 NOTE — OB PROVIDER TRIAGE NOTE - NS_CHIEFCOMPLAINTOTHER_OBGYN_ALL_OB_FT
Elevated BP at home 152/102.  EMTALA /86 Elevated BP at home 152/102.  EMTALA /86 ADD: 1827 Pt in room now c/o "heaviness in her chest"

## 2021-11-24 NOTE — OB RN TRIAGE NOTE - NSNURSINGINSTR_OBGYN_ALL_OB_FT
pt evaluated for c/o elevated blood pressures, headache pain 4/10.  pt meets criteria for d/c to home.  d/c instructions given by daniel rojo, plan d/w

## 2021-11-24 NOTE — OB PROVIDER TRIAGE NOTE - HISTORY OF PRESENT ILLNESS
32 yo  @ 35.4 wks c/o headache and nausea since 4pm took BP at home 152/102, also c/o chest pressure and chills. denies covid 19, and vaccinated x 2 last 2021. denies vb lof or contractions, +GFM. AP course complicated by GDMA1 diet controlled, elevated BP last week at 3am seen here and sent home to check BP 2 x day, scheduled for repeat c/s  but moving to 37 wks- first week of september. denies dysuria, new swelling epigatsric pain cough, sick contacts, travel. last saw OB today for repeat scan to check fluid last week SUSAN 6.5 this week- today 15, EFW last week 6#1. pt reports not drinking water today  GBS: pending results    meds: PNV iron Baby ASA  All: Cipro, PCN, Erythro benadryl ceftriaxone  PMH: denies  PSH: d&c 2020, d&c for bleeding, lap silvia  gyn hx: abn pap repeat wnl  ob hx: MAB 2020 with d&c, primary c/s for maternal tempfetal distress turned breech and nuchal cord

## 2021-11-24 NOTE — OB PROVIDER TRIAGE NOTE - NSOBPROVIDERNOTE_OBGYN_ALL_OB_FT
tylenol Reglan for headache   cc bolus for not drinking and dehydrated  PEC labs  EKG  RVP tylenol Reglan for headache 5/10   cc bolus for not drinking and dehydrated  PEC labs  EKG  RVP  Dr Whittington at provider desk discussing case reviewing FHT  pt report no pain or nausea after tylenol and reglan 0/10  BPP 8/8  scheduled to see Dr Ann Monday and Dr Dove Tuesday tylenol Reglan for headache 5/10   cc bolus for not drinking and dehydrated  PEC labs  EKG  RVP  Dr Whittington at provider desk discussing case reviewing FHT  pt report no pain or nausea after tylenol and reglan 010  BPP   scheduled to see Dr Ann Monday and Dr Dove Tuesday  d/w Dr Whittington d/c home no evidence of PEC @ 35.4 wks  maternal and fetal surveillance reassuring  rest activity as tolerated  increase water intake   labor precautions instructed  continue fetal kick counts  continue taking blood sugars  continue taking blood pressures 2 x day call MD if BP >140/90  may take tylenol if needed for pain  return for vaginal bleeding leakage of fluid decreased fetal movement or any concerns  v/w discharge instructions given with verbal understanding by patient

## 2021-11-24 NOTE — OB PROVIDER TRIAGE NOTE - NSHPLABSRESULTS_GEN_ALL_CORE
p/w tachycardia, tachypnea, and leukocytosis likely in the setting COVID-19 infection  - see COVID-19 infection below p/w tachycardia, tachypnea, and leukocytosis 2/2 COVID-19 infection  - see COVID-19 infection below 12.8   11.38 )-----------( 209      ( 2021 19:45 )             38.6   11    134<L>  |  101  |  6<L>  ----------------------------<  101<H>  4.1   |  22  |  0.51    Ca    9.1      2021 19:45    TPro  6.3  /  Alb  3.2<L>  /  TBili  0.3  /  DBili  x   /  AST  10  /  ALT  7   /  AlkPhos  131<H>  1124  PT/INR - ( 2021 19:45 )   PT: 12.3 sec;   INR: 1.08 ratio         PTT - ( 2021 19:45 )  PTT:31.1 sec  Urinalysis Basic - ( 2021 19:45 )    Color: Yellow / Appearance: Clear / S.023 / pH: x  Gluc: x / Ketone: Moderate  / Bili: Negative / Urobili: 3 mg/dL   Blood: x / Protein: 30 mg/dL / Nitrite: Negative   Leuk Esterase: Negative / RBC: 2 /HPF / WBC 2 /HPF   Sq Epi: x / Non Sq Epi: 2 /HPF / Bacteria: Negative 12.8   11.38 )-----------( 209      ( 2021 19:45 )             38.6   11    134<L>  |  101  |  6<L>  ----------------------------<  101<H>  4.1   |  22  |  0.51    Ca    9.1      2021 19:45    TPro  6.3  /  Alb  3.2<L>  /  TBili  0.3  /  DBili  x   /  AST  10  /  ALT  7   /  AlkPhos  131<H>  1124  PT/INR - ( 2021 19:45 )   PT: 12.3 sec;   INR: 1.08 ratio         PTT - ( 2021 19:45 )  PTT:31.1 sec  Urinalysis Basic - ( 2021 19:45 )    Color: Yellow / Appearance: Clear / S.023 / pH: x  Gluc: x / Ketone: Moderate  / Bili: Negative / Urobili: 3 mg/dL   Blood: x / Protein: 30 mg/dL / Nitrite: Negative   Leuk Esterase: Negative / RBC: 2 /HPF / WBC 2 /HPF   Sq Epi: x / Non Sq Epi: 2 /HPF / Bacteria: Negative  pcr 0.1 12.8   11.38 )-----------( 209      ( 2021 19:45 )             38.6   11    134<L>  |  101  |  6<L>  ----------------------------<  101<H>  4.1   |  22  |  0.51    Ca    9.1      2021 19:45    TPro  6.3  /  Alb  3.2<L>  /  TBili  0.3  /  DBili  x   /  AST  10  /  ALT  7   /  AlkPhos  131<H>  1124  PT/INR - ( 2021 19:45 )   PT: 12.3 sec;   INR: 1.08 ratio         PTT - ( 2021 19:45 )  PTT:31.1 sec  Urinalysis Basic - ( 2021 19:45 )    Color: Yellow / Appearance: Clear / S.023 / pH: x  Gluc: x / Ketone: Moderate  / Bili: Negative / Urobili: 3 mg/dL   Blood: x / Protein: 30 mg/dL / Nitrite: Negative   Leuk Esterase: Negative / RBC: 2 /HPF / WBC 2 /HPF   Sq Epi: x / Non Sq Epi: 2 /HPF / Bacteria: Negative  pcr 0.1  RVP negative

## 2021-11-29 ENCOUNTER — APPOINTMENT (OUTPATIENT)
Dept: ANTEPARTUM | Facility: CLINIC | Age: 31
End: 2021-11-29
Payer: COMMERCIAL

## 2021-11-29 PROCEDURE — ZZZZZ: CPT

## 2021-11-29 PROCEDURE — 76818 FETAL BIOPHYS PROFILE W/NST: CPT

## 2021-11-29 NOTE — HISTORY OF PRESENT ILLNESS
[FreeTextEntry1] : The patient was seen for follow up of her gestational diabetes. She is 36.2 weeks pregnant and is managed by diet alone. The patient has no new complaints and her blood glucose log was reviewed today.\par \par Fasting sugars range < 90 mg/dL.\par Post prandial sugars range < 140 mg/dL.\par \par The patient is keeping an adequate log and is compliant with treatment.\par \par Ultrasound was done and a separate report has been sent to your office.\par Fetal biophysical profile was done and fetal status was reassuring.

## 2021-11-30 ENCOUNTER — OUTPATIENT (OUTPATIENT)
Dept: OUTPATIENT SERVICES | Facility: HOSPITAL | Age: 31
LOS: 1 days | End: 2021-11-30

## 2021-11-30 VITALS
OXYGEN SATURATION: 99 % | HEIGHT: 60 IN | SYSTOLIC BLOOD PRESSURE: 120 MMHG | TEMPERATURE: 97 F | DIASTOLIC BLOOD PRESSURE: 84 MMHG | HEART RATE: 77 BPM | WEIGHT: 169.98 LBS | RESPIRATION RATE: 16 BRPM

## 2021-11-30 DIAGNOSIS — O34.211 MATERNAL CARE FOR LOW TRANSVERSE SCAR FROM PREVIOUS CESAREAN DELIVERY: ICD-10-CM

## 2021-11-30 DIAGNOSIS — O24.419 GESTATIONAL DIABETES MELLITUS IN PREGNANCY, UNSPECIFIED CONTROL: ICD-10-CM

## 2021-11-30 DIAGNOSIS — Z98.890 OTHER SPECIFIED POSTPROCEDURAL STATES: Chronic | ICD-10-CM

## 2021-11-30 DIAGNOSIS — Z98.891 HISTORY OF UTERINE SCAR FROM PREVIOUS SURGERY: Chronic | ICD-10-CM

## 2021-11-30 LAB
ANION GAP SERPL CALC-SCNC: 13 MMOL/L — SIGNIFICANT CHANGE UP (ref 7–14)
APPEARANCE UR: CLEAR — SIGNIFICANT CHANGE UP
BILIRUB UR-MCNC: NEGATIVE — SIGNIFICANT CHANGE UP
BLD GP AB SCN SERPL QL: NEGATIVE — SIGNIFICANT CHANGE UP
BUN SERPL-MCNC: 7 MG/DL — SIGNIFICANT CHANGE UP (ref 7–23)
CALCIUM SERPL-MCNC: 9.1 MG/DL — SIGNIFICANT CHANGE UP (ref 8.4–10.5)
CHLORIDE SERPL-SCNC: 102 MMOL/L — SIGNIFICANT CHANGE UP (ref 98–107)
CO2 SERPL-SCNC: 20 MMOL/L — LOW (ref 22–31)
COLOR SPEC: COLORLESS — SIGNIFICANT CHANGE UP
CREAT SERPL-MCNC: 0.47 MG/DL — LOW (ref 0.5–1.3)
DIFF PNL FLD: NEGATIVE — SIGNIFICANT CHANGE UP
GLUCOSE SERPL-MCNC: 61 MG/DL — LOW (ref 70–99)
GLUCOSE UR QL: NEGATIVE — SIGNIFICANT CHANGE UP
HCT VFR BLD CALC: 37.3 % — SIGNIFICANT CHANGE UP (ref 34.5–45)
HGB BLD-MCNC: 12.5 G/DL — SIGNIFICANT CHANGE UP (ref 11.5–15.5)
KETONES UR-MCNC: NEGATIVE — SIGNIFICANT CHANGE UP
LEUKOCYTE ESTERASE UR-ACNC: ABNORMAL
MCHC RBC-ENTMCNC: 30.5 PG — SIGNIFICANT CHANGE UP (ref 27–34)
MCHC RBC-ENTMCNC: 33.5 GM/DL — SIGNIFICANT CHANGE UP (ref 32–36)
MCV RBC AUTO: 91 FL — SIGNIFICANT CHANGE UP (ref 80–100)
NITRITE UR-MCNC: NEGATIVE — SIGNIFICANT CHANGE UP
NRBC # BLD: 0 /100 WBCS — SIGNIFICANT CHANGE UP
NRBC # FLD: 0 K/UL — SIGNIFICANT CHANGE UP
PH UR: 7 — SIGNIFICANT CHANGE UP (ref 5–8)
PLATELET # BLD AUTO: 235 K/UL — SIGNIFICANT CHANGE UP (ref 150–400)
POTASSIUM SERPL-MCNC: 4 MMOL/L — SIGNIFICANT CHANGE UP (ref 3.5–5.3)
POTASSIUM SERPL-SCNC: 4 MMOL/L — SIGNIFICANT CHANGE UP (ref 3.5–5.3)
PROT UR-MCNC: NEGATIVE — SIGNIFICANT CHANGE UP
RBC # BLD: 4.1 M/UL — SIGNIFICANT CHANGE UP (ref 3.8–5.2)
RBC # FLD: 13.4 % — SIGNIFICANT CHANGE UP (ref 10.3–14.5)
RH IG SCN BLD-IMP: POSITIVE — SIGNIFICANT CHANGE UP
SODIUM SERPL-SCNC: 135 MMOL/L — SIGNIFICANT CHANGE UP (ref 135–145)
SP GR SPEC: 1.01 — SIGNIFICANT CHANGE UP (ref 1–1.05)
UROBILINOGEN FLD QL: SIGNIFICANT CHANGE UP
WBC # BLD: 10.28 K/UL — SIGNIFICANT CHANGE UP (ref 3.8–10.5)
WBC # FLD AUTO: 10.28 K/UL — SIGNIFICANT CHANGE UP (ref 3.8–10.5)

## 2021-11-30 RX ORDER — SODIUM CHLORIDE 9 MG/ML
1000 INJECTION, SOLUTION INTRAVENOUS
Refills: 0 | Status: DISCONTINUED | OUTPATIENT
Start: 2021-12-09 | End: 2021-12-10

## 2021-11-30 NOTE — OB PST NOTE - NSHPREVIEWOFSYSTEMS_GEN_ALL_CORE
General: no fever, chills, sweating, anorexia, or weight loss    Skin: no rashes, itching, or dryness    Breast: no tenderness, lumps, or nipple discharge    Opthalmologic: no diplopia, photophobia, or blurred vision    ENMT: no hearing difficulty, ear pain, tinnitus, or vertigo. No sinus symptoms, nasal congestion, nasal discharge, or nasal obstruction    Respiratory and Thorax: no wheezing, dyspnea, or cough    Cardiovascular: no chest pain, palpitations, dyspnea on exertion, orthopnea, or peripheral edema    Gastrointestinal: no nausea, vomiting, diarrhea, constipation, change in bowel movements, or abdominal pain    Genitourinary and Pelvis: no hematuria, renal colic, flank pain, dysuria, nocturia. No abnormal vaginal bleeding, vaginal discharge, spotting, pelvic pain, or vaginal leakage    Musculoskeletal: no arthralgia, arthritis, joint swelling, muscle cramping, muscle weakness, neck pain, arm pain, or leg pain    Neurological: no transient paralysis, weakness, paresthesias, or seizures. No syncope, tremors, vertigo, loss of sensation, difficulty walking, loss of consciousness    Psychiatric: no suicidal ideation, depression, anxiety    Hematology: no nose bleeding, easy bruising or bleeding, or skin lumps    Lymphatic: no enlarged or tender lymph nodes, or extremity swelling    Endocrine: no heat or cold intolerance    Immunologic: no recurrent or persistent infections

## 2021-11-30 NOTE — OB PST NOTE - NSHPPHYSICALEXAM_GEN_ALL_CORE
Constitutional: well developed, well groomed, well nourished, no distress    Eyes: PERRL, EOMI, conjunctiva clear    Ears: normal    Mouth and Gums: normal, moist    Pharynx: no tenderness, discharge, or peritonsillar abscess    Tonsils: no redness, discharge, tenderness, or swelling    Neck: supple, no JVD, normal thyroid gland, no cervical or paraspinal tenderness    Breast: normal shape    Back: normal shape, ROM intact, strength intact, no vertebral tenderness    Respiratory: airway patent, breast sounds equal, good air movement, respiration non-labored, clear to auscultation bilaterally, no chest wall tenderness, no wheezes, rhonchi, or rales    Cardiovascular: regular rate and rhythm, no rubs, murmur, normal PMI    Gastrointestinal: gravid abdomen, non tender, normal bowel sounds    Extremities: no clubbing, cyanosis, or pedal edema    Vascular: carotid pulse normal, radial pulse normal, DP pulse normal, PT pulse normal    Neurological: alert and oriented x3, sensation intact, cranial nerves intact, normal strength    Skin: warm and dry, normal color    Lymph nodes: no anterior cervical lymphadenopathy    Musculoskeletal: ROM intact, normal strength, no joint swelling, warmth, or calf tenderness    Psychiatric: normal affect, normal behavior Constitutional: well developed, well groomed, well nourished, no distress    Eyes: PERRL, EOMI, conjunctiva clear    Ears: normal    Mouth and Gums: normal, moist    Pharynx: no tenderness, discharge, or peritonsillar abscess    Tonsils: no redness, discharge, tenderness, or swelling    Neck: supple, no JVD, normal thyroid gland, no cervical or paraspinal tenderness    Breast: normal shape    Back: normal shape, ROM intact, strength intact, no vertebral tenderness    Respiratory: airway patent, breast sounds equal, good air movement, respiration non-labored, clear to auscultation bilaterally, no chest wall tenderness, no wheezes, rhonchi, or rales    Cardiovascular: regular rate and rhythm, no rubs, murmur, normal PMI    Gastrointestinal: gravid abdomen, non tender, normal bowel sounds    Extremities: no clubbing, cyanosis, or pedal edema    Vascular: carotid pulse normal, radial pulse normal, DP pulse normal, PT pulse normal    Neurological: alert and oriented x3, normal strength    Skin: warm and dry, normal color    Lymph nodes: no anterior cervical lymphadenopathy    Musculoskeletal: ROM intact, normal strength, no joint swelling, warmth, or calf tenderness    Psychiatric: normal affect, normal behavior

## 2021-11-30 NOTE — OB PST NOTE - HISTORY OF PRESENT ILLNESS
31 year old pregnant female presents to presurgical testing scheduled for Repeat  Caesarean section, Bilateral tubal ligation. Patient denies vaginal  bleeding, spotting or leakage of amniotic fluid. Patient denies regular contractions. Patient reports positive fetal movement.
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
## 2021-11-30 NOTE — OB PST NOTE - PROBLEM SELECTOR PLAN 1
Patient tentatively scheduled for Patient tentatively scheduled  for Repeat  Caesarean section, Bilateral tubal ligation on 12/09/2021.  Pre-op instructions provided. Pt given verbal and written instructions with teach back on chlorhexidine shampoo. Pt verbalized understanding with return demonstration.   Pt strongly advised to follow up with surgeon to discuss COVID testing requirements prior to procedure.    Pt instructed to obtain all medication instructions from OB.  Labs- CBC, BMP, UA, T/S  pending

## 2021-12-01 ENCOUNTER — NON-APPOINTMENT (OUTPATIENT)
Age: 31
End: 2021-12-01

## 2021-12-01 ENCOUNTER — APPOINTMENT (OUTPATIENT)
Dept: ANTEPARTUM | Facility: CLINIC | Age: 31
End: 2021-12-01
Payer: COMMERCIAL

## 2021-12-01 VITALS — BODY MASS INDEX: 33.4 KG/M2 | WEIGHT: 171 LBS | DIASTOLIC BLOOD PRESSURE: 89 MMHG | SYSTOLIC BLOOD PRESSURE: 134 MMHG

## 2021-12-01 PROCEDURE — ZZZZZ: CPT

## 2021-12-06 ENCOUNTER — APPOINTMENT (OUTPATIENT)
Dept: OBGYN | Facility: CLINIC | Age: 31
End: 2021-12-06
Payer: COMMERCIAL

## 2021-12-06 VITALS
DIASTOLIC BLOOD PRESSURE: 86 MMHG | SYSTOLIC BLOOD PRESSURE: 135 MMHG | BODY MASS INDEX: 33.18 KG/M2 | HEIGHT: 60 IN | WEIGHT: 169 LBS

## 2021-12-06 PROCEDURE — 76816 OB US FOLLOW-UP PER FETUS: CPT

## 2021-12-06 PROCEDURE — 76818 FETAL BIOPHYS PROFILE W/NST: CPT

## 2021-12-06 NOTE — HISTORY OF PRESENT ILLNESS
[FreeTextEntry1] : The patient was seen for follow up of her gestational diabetes. She is XX weeks pregnant and is managed by diet and XX. The patient has no new complaints and her blood glucose log was reviewed today.\par \par Fasting sugars range < 90 mg/dL.\par Post prandial sugars range < 140 mg/dL.\par \par The patient is keeping an adequate log and is compliant with treatment.\par \par Ultrasound was done and a separate report has been sent to your office.\par Fetal biophysical profile was done and fetal status was reassuring.

## 2021-12-06 NOTE — DISCUSSION/SUMMARY
[FreeTextEntry1] : The patient's diet and blood sugars were reviewed and are well controlled. The patient will continue with current management.\par \par Patient is a 31 year old at XX weeks GA with [insert high risk] pregnancy. She is a patient of [insert primary OB], seen in our office today for BPP/NST.\par - Vertex\par - BPP 10/10\par - Normal SUSAN\par - NST reactive\par - f/u in 1 week for repeat BPP/NST\par - recommend patient for induction of labor/ at XX weeks

## 2021-12-08 ENCOUNTER — TRANSCRIPTION ENCOUNTER (OUTPATIENT)
Age: 31
End: 2021-12-08

## 2021-12-09 ENCOUNTER — RESULT REVIEW (OUTPATIENT)
Age: 31
End: 2021-12-09

## 2021-12-09 ENCOUNTER — TRANSCRIPTION ENCOUNTER (OUTPATIENT)
Age: 31
End: 2021-12-09

## 2021-12-09 ENCOUNTER — INPATIENT (INPATIENT)
Facility: HOSPITAL | Age: 31
LOS: 1 days | Discharge: HOME | End: 2021-12-11
Attending: SPECIALIST | Admitting: SPECIALIST
Payer: COMMERCIAL

## 2021-12-09 VITALS — HEART RATE: 95 BPM | SYSTOLIC BLOOD PRESSURE: 136 MMHG | DIASTOLIC BLOOD PRESSURE: 97 MMHG

## 2021-12-09 DIAGNOSIS — Z98.891 HISTORY OF UTERINE SCAR FROM PREVIOUS SURGERY: ICD-10-CM

## 2021-12-09 DIAGNOSIS — O13.3 GESTATIONAL [PREGNANCY-INDUCED] HYPERTENSION WITHOUT SIGNIFICANT PROTEINURIA, THIRD TRIMESTER: ICD-10-CM

## 2021-12-09 DIAGNOSIS — Z98.890 OTHER SPECIFIED POSTPROCEDURAL STATES: Chronic | ICD-10-CM

## 2021-12-09 DIAGNOSIS — O34.211 MATERNAL CARE FOR LOW TRANSVERSE SCAR FROM PREVIOUS CESAREAN DELIVERY: ICD-10-CM

## 2021-12-09 DIAGNOSIS — Z98.891 HISTORY OF UTERINE SCAR FROM PREVIOUS SURGERY: Chronic | ICD-10-CM

## 2021-12-09 LAB
ALBUMIN SERPL ELPH-MCNC: 3.4 G/DL — SIGNIFICANT CHANGE UP (ref 3.3–5)
ALP SERPL-CCNC: 147 U/L — HIGH (ref 40–120)
ALT FLD-CCNC: 7 U/L — SIGNIFICANT CHANGE UP (ref 4–33)
ANION GAP SERPL CALC-SCNC: 13 MMOL/L — SIGNIFICANT CHANGE UP (ref 7–14)
APTT BLD: 23.5 SEC — LOW (ref 27–36.3)
AST SERPL-CCNC: 20 U/L — SIGNIFICANT CHANGE UP (ref 4–32)
BASOPHILS # BLD AUTO: 0.03 K/UL — SIGNIFICANT CHANGE UP (ref 0–0.2)
BASOPHILS NFR BLD AUTO: 0.3 % — SIGNIFICANT CHANGE UP (ref 0–2)
BILIRUB SERPL-MCNC: 0.2 MG/DL — SIGNIFICANT CHANGE UP (ref 0.2–1.2)
BLD GP AB SCN SERPL QL: NEGATIVE — SIGNIFICANT CHANGE UP
BUN SERPL-MCNC: 7 MG/DL — SIGNIFICANT CHANGE UP (ref 7–23)
CALCIUM SERPL-MCNC: 9 MG/DL — SIGNIFICANT CHANGE UP (ref 8.4–10.5)
CHLORIDE SERPL-SCNC: 103 MMOL/L — SIGNIFICANT CHANGE UP (ref 98–107)
CO2 SERPL-SCNC: 20 MMOL/L — LOW (ref 22–31)
COVID-19 SPIKE DOMAIN AB INTERP: POSITIVE
COVID-19 SPIKE DOMAIN ANTIBODY RESULT: >250 U/ML — HIGH
CREAT SERPL-MCNC: 0.51 MG/DL — SIGNIFICANT CHANGE UP (ref 0.5–1.3)
EOSINOPHIL # BLD AUTO: 0.06 K/UL — SIGNIFICANT CHANGE UP (ref 0–0.5)
EOSINOPHIL NFR BLD AUTO: 0.5 % — SIGNIFICANT CHANGE UP (ref 0–6)
FIBRINOGEN PPP-MCNC: 696 MG/DL — HIGH (ref 290–520)
GLUCOSE BLDC GLUCOMTR-MCNC: 80 MG/DL — SIGNIFICANT CHANGE UP (ref 70–99)
GLUCOSE SERPL-MCNC: 81 MG/DL — SIGNIFICANT CHANGE UP (ref 70–99)
HCT VFR BLD CALC: 31.5 % — LOW (ref 34.5–45)
HCT VFR BLD CALC: 40.2 % — SIGNIFICANT CHANGE UP (ref 34.5–45)
HGB BLD-MCNC: 11.2 G/DL — LOW (ref 11.5–15.5)
HGB BLD-MCNC: 13.3 G/DL — SIGNIFICANT CHANGE UP (ref 11.5–15.5)
IANC: 8.14 K/UL — SIGNIFICANT CHANGE UP (ref 1.5–8.5)
IMM GRANULOCYTES NFR BLD AUTO: 0.5 % — SIGNIFICANT CHANGE UP (ref 0–1.5)
INR BLD: 1 RATIO — SIGNIFICANT CHANGE UP (ref 0.88–1.16)
LYMPHOCYTES # BLD AUTO: 2.66 K/UL — SIGNIFICANT CHANGE UP (ref 1–3.3)
LYMPHOCYTES # BLD AUTO: 22.9 % — SIGNIFICANT CHANGE UP (ref 13–44)
MCHC RBC-ENTMCNC: 30.6 PG — SIGNIFICANT CHANGE UP (ref 27–34)
MCHC RBC-ENTMCNC: 31.8 PG — SIGNIFICANT CHANGE UP (ref 27–34)
MCHC RBC-ENTMCNC: 33.1 GM/DL — SIGNIFICANT CHANGE UP (ref 32–36)
MCHC RBC-ENTMCNC: 35.6 GM/DL — SIGNIFICANT CHANGE UP (ref 32–36)
MCV RBC AUTO: 89.5 FL — SIGNIFICANT CHANGE UP (ref 80–100)
MCV RBC AUTO: 92.6 FL — SIGNIFICANT CHANGE UP (ref 80–100)
MONOCYTES # BLD AUTO: 0.69 K/UL — SIGNIFICANT CHANGE UP (ref 0–0.9)
MONOCYTES NFR BLD AUTO: 5.9 % — SIGNIFICANT CHANGE UP (ref 2–14)
NEUTROPHILS # BLD AUTO: 8.14 K/UL — HIGH (ref 1.8–7.4)
NEUTROPHILS NFR BLD AUTO: 69.9 % — SIGNIFICANT CHANGE UP (ref 43–77)
NRBC # BLD: 0 /100 WBCS — SIGNIFICANT CHANGE UP
NRBC # BLD: 0 /100 WBCS — SIGNIFICANT CHANGE UP
NRBC # FLD: 0 K/UL — SIGNIFICANT CHANGE UP
NRBC # FLD: 0 K/UL — SIGNIFICANT CHANGE UP
PLATELET # BLD AUTO: 190 K/UL — SIGNIFICANT CHANGE UP (ref 150–400)
PLATELET # BLD AUTO: 254 K/UL — SIGNIFICANT CHANGE UP (ref 150–400)
POTASSIUM SERPL-MCNC: 4.6 MMOL/L — SIGNIFICANT CHANGE UP (ref 3.5–5.3)
POTASSIUM SERPL-SCNC: 4.6 MMOL/L — SIGNIFICANT CHANGE UP (ref 3.5–5.3)
PROT SERPL-MCNC: 6.9 G/DL — SIGNIFICANT CHANGE UP (ref 6–8.3)
PROTHROM AB SERPL-ACNC: 11.5 SEC — SIGNIFICANT CHANGE UP (ref 10.6–13.6)
RBC # BLD: 3.52 M/UL — LOW (ref 3.8–5.2)
RBC # BLD: 4.34 M/UL — SIGNIFICANT CHANGE UP (ref 3.8–5.2)
RBC # FLD: 13.5 % — SIGNIFICANT CHANGE UP (ref 10.3–14.5)
RBC # FLD: 13.7 % — SIGNIFICANT CHANGE UP (ref 10.3–14.5)
RH IG SCN BLD-IMP: POSITIVE — SIGNIFICANT CHANGE UP
SARS-COV-2 IGG+IGM SERPL QL IA: >250 U/ML — HIGH
SARS-COV-2 IGG+IGM SERPL QL IA: POSITIVE
SODIUM SERPL-SCNC: 136 MMOL/L — SIGNIFICANT CHANGE UP (ref 135–145)
T PALLIDUM AB TITR SER: NEGATIVE — SIGNIFICANT CHANGE UP
WBC # BLD: 11.64 K/UL — HIGH (ref 3.8–10.5)
WBC # BLD: 17.85 K/UL — HIGH (ref 3.8–10.5)
WBC # FLD AUTO: 11.64 K/UL — HIGH (ref 3.8–10.5)
WBC # FLD AUTO: 17.85 K/UL — HIGH (ref 3.8–10.5)

## 2021-12-09 PROCEDURE — 88307 TISSUE EXAM BY PATHOLOGIST: CPT | Mod: 26

## 2021-12-09 RX ORDER — ACETAMINOPHEN 500 MG
975 TABLET ORAL
Refills: 0 | Status: DISCONTINUED | OUTPATIENT
Start: 2021-12-09 | End: 2021-12-11

## 2021-12-09 RX ORDER — SODIUM CHLORIDE 9 MG/ML
1000 INJECTION, SOLUTION INTRAVENOUS ONCE
Refills: 0 | Status: COMPLETED | OUTPATIENT
Start: 2021-12-09 | End: 2021-12-09

## 2021-12-09 RX ORDER — ASPIRIN/CALCIUM CARB/MAGNESIUM 324 MG
1 TABLET ORAL
Qty: 0 | Refills: 0 | DISCHARGE

## 2021-12-09 RX ORDER — LANOLIN
1 OINTMENT (GRAM) TOPICAL EVERY 6 HOURS
Refills: 0 | Status: DISCONTINUED | OUTPATIENT
Start: 2021-12-09 | End: 2021-12-11

## 2021-12-09 RX ORDER — SODIUM CHLORIDE 9 MG/ML
1000 INJECTION, SOLUTION INTRAVENOUS
Refills: 0 | Status: DISCONTINUED | OUTPATIENT
Start: 2021-12-09 | End: 2021-12-10

## 2021-12-09 RX ORDER — CITRIC ACID/SODIUM CITRATE 300-500 MG
30 SOLUTION, ORAL ORAL ONCE
Refills: 0 | Status: COMPLETED | OUTPATIENT
Start: 2021-12-09 | End: 2021-12-09

## 2021-12-09 RX ORDER — OXYCODONE HYDROCHLORIDE 5 MG/1
5 TABLET ORAL ONCE
Refills: 0 | Status: DISCONTINUED | OUTPATIENT
Start: 2021-12-10 | End: 2021-12-11

## 2021-12-09 RX ORDER — KETOROLAC TROMETHAMINE 30 MG/ML
30 SYRINGE (ML) INJECTION EVERY 6 HOURS
Refills: 0 | Status: DISCONTINUED | OUTPATIENT
Start: 2021-12-09 | End: 2021-12-10

## 2021-12-09 RX ORDER — FERROUS SULFATE 325(65) MG
1 TABLET ORAL
Qty: 0 | Refills: 0 | DISCHARGE

## 2021-12-09 RX ORDER — HEPARIN SODIUM 5000 [USP'U]/ML
5000 INJECTION INTRAVENOUS; SUBCUTANEOUS EVERY 12 HOURS
Refills: 0 | Status: DISCONTINUED | OUTPATIENT
Start: 2021-12-09 | End: 2021-12-11

## 2021-12-09 RX ORDER — TETANUS TOXOID, REDUCED DIPHTHERIA TOXOID AND ACELLULAR PERTUSSIS VACCINE, ADSORBED 5; 2.5; 8; 8; 2.5 [IU]/.5ML; [IU]/.5ML; UG/.5ML; UG/.5ML; UG/.5ML
0.5 SUSPENSION INTRAMUSCULAR ONCE
Refills: 0 | Status: DISCONTINUED | OUTPATIENT
Start: 2021-12-10 | End: 2021-12-11

## 2021-12-09 RX ORDER — SIMETHICONE 80 MG/1
80 TABLET, CHEWABLE ORAL EVERY 4 HOURS
Refills: 0 | Status: DISCONTINUED | OUTPATIENT
Start: 2021-12-09 | End: 2021-12-11

## 2021-12-09 RX ORDER — OXYCODONE HYDROCHLORIDE 5 MG/1
5 TABLET ORAL
Refills: 0 | Status: DISCONTINUED | OUTPATIENT
Start: 2021-12-10 | End: 2021-12-11

## 2021-12-09 RX ORDER — OXYTOCIN 10 UNIT/ML
333.33 VIAL (ML) INJECTION
Qty: 20 | Refills: 0 | Status: DISCONTINUED | OUTPATIENT
Start: 2021-12-09 | End: 2021-12-10

## 2021-12-09 RX ORDER — FAMOTIDINE 10 MG/ML
20 INJECTION INTRAVENOUS ONCE
Refills: 0 | Status: COMPLETED | OUTPATIENT
Start: 2021-12-09 | End: 2021-12-09

## 2021-12-09 RX ORDER — MAGNESIUM HYDROXIDE 400 MG/1
30 TABLET, CHEWABLE ORAL
Refills: 0 | Status: DISCONTINUED | OUTPATIENT
Start: 2021-12-10 | End: 2021-12-11

## 2021-12-09 RX ORDER — IBUPROFEN 200 MG
600 TABLET ORAL EVERY 6 HOURS
Refills: 0 | Status: COMPLETED | OUTPATIENT
Start: 2021-12-10 | End: 2022-11-08

## 2021-12-09 RX ADMIN — HEPARIN SODIUM 5000 UNIT(S): 5000 INJECTION INTRAVENOUS; SUBCUTANEOUS at 18:35

## 2021-12-09 RX ADMIN — FAMOTIDINE 20 MILLIGRAM(S): 10 INJECTION INTRAVENOUS at 09:29

## 2021-12-09 RX ADMIN — Medication 30 MILLILITER(S): at 09:30

## 2021-12-09 RX ADMIN — Medication 30 MILLIGRAM(S): at 19:21

## 2021-12-09 RX ADMIN — SODIUM CHLORIDE 125 MILLILITER(S): 9 INJECTION, SOLUTION INTRAVENOUS at 09:29

## 2021-12-09 RX ADMIN — SODIUM CHLORIDE 2000 MILLILITER(S): 9 INJECTION, SOLUTION INTRAVENOUS at 09:29

## 2021-12-09 RX ADMIN — Medication 30 MILLIGRAM(S): at 18:35

## 2021-12-09 NOTE — OB PROVIDER H&P - HISTORY OF PRESENT ILLNESS
31 year old female  EDC 21 @37+5w presents for scheduled Repeat  Caesarean section, Bilateral tubal ligation. Patient denies vaginal  bleeding, spotting or leakage of amniotic fluid. +intermittent ctx for "couple weeks". Patient reports positive fetal movement. GBS negative COVID negative 21, vaccinated.     AP course complicated by   gHTN dx @36w 24 hour urine normal reports BP ~140/100s  GDMA1  OBhx  21 pLTCS Breech at fully dilated NRFHT 5#13 @ 38 weeks nuchal cord x3 postpartum sPEC on Mg complicated by HELLP sent home on labetalol & procardia x 2 weeks  2020 MAB w D&C  GYNhx denies cysts, STDS, hx abnormal pap x1 10 years ago, small fibroid found in pregnancy  PMHx  Anemia  PSHx   pLTCS  2009 D&C for abnormal bleeding on nuvaring,  D&C for missed AB   laparoscopic cholecystomy    kidney stent for stones  Social Denies x3  Psych Denies    Allergies Benadryl (anaphylaxis) cerftriaxone (hives) cipro, erthyromycin, PCN (diarrhea)  Meds Iron, PNV, ASA 81mg po

## 2021-12-09 NOTE — OB PROVIDER H&P - ASSESSMENT
GEN NAD  Lungs CTAB  Heart RRR  Abd soft, NT, gravid  HR: 95 (09 Dec 2021 08:21) (95 - 95)  BP: 136/97 (09 Dec 2021 08:21) (136/97 - 136/97)   moderate variability + accels no decels  [place] irregular  EFW~6#11 on monday     AP 31 year old female  EDC 21 @37+5w presents for scheduled Repeat  Caesarean section, Bilateral tubal ligation.    Admit to L&D  Routine labs   HELLP labs  IVF  NPO  Anesthesia consult    KARI rdz

## 2021-12-09 NOTE — OB RN INTRAOPERATIVE NOTE - NSSELHIDDEN_OBGYN_ALL_OB_FT
[NS_DeliveryAttending1_OBGYN_ALL_OB_FT:[id number]],[NS_DeliveryAssist1_OBGYN_ALL_OB_FT:[id number]=]

## 2021-12-09 NOTE — OB RN PATIENT PROFILE - NSICDXPASTSURGICALHX_GEN_ALL_CORE_FT
stated PAST SURGICAL HISTORY:  h/o cholecystectomy  laproscopic silvia    History of  2016    History of D&C 2009

## 2021-12-09 NOTE — DISCHARGE NOTE OB - PATIENT PORTAL LINK FT
You can access the FollowMyHealth Patient Portal offered by [facility] by registering at the following website: [email or web address]. By joining [facility]’s FollowMyHealth portal, you will also be able to view your health information using other applications (apps) compatible with our system.

## 2021-12-09 NOTE — OB PROVIDER H&P - NS_PARA_OBGYN_ALL_OB_NU
1 Azithromycin Counseling:  I discussed with the patient the risks of azithromycin including but not limited to GI upset, allergic reaction, drug rash, diarrhea, and yeast infections.

## 2021-12-09 NOTE — OB RN DELIVERY SUMMARY - NSSELHIDDEN_OBGYN_ALL_OB_FT
[NS_DeliveryAttending1_OBGYN_ALL_OB_FT:[id number]],[NS_DeliveryAssist1_OBGYN_ALL_OB_FT:[id number]=]

## 2021-12-09 NOTE — DISCHARGE NOTE OB - NS MD DC FALL RISK RISK
For information on Fall & Injury Prevention, visit: [email or web address] OR  [email or web address] OR  https://www.cdc.gov/steadi/patient.html

## 2021-12-09 NOTE — DISCHARGE NOTE OB - CARE PROVIDER_API CALL
Chilango Dove)  Obstetrics and Gynecology  [address] [place] [address]  Phone: [phone]  Fax: [phone]  Follow Up Time:

## 2021-12-09 NOTE — OB RN DELIVERY SUMMARY - NS_SEPSISRSKCALC_OBGYN_ALL_OB_FT
EOS calculated successfully. EOS Risk Factor: 0.5/1000 live births ([facility] national incidence); GA=37w5d; Temp=100.6; ROM=0; GBS='Negative'; Antibiotics='No antibiotics or any antibiotics < 2 hrs prior to birth'

## 2021-12-09 NOTE — OB RN PATIENT PROFILE - NS_GBS_INFANT_INVASIVE_OBGYN_ALL_OB_FT
Patient in office today for anti-coag management. POC INR obtained and anti-counter created and routed. Patient states no history

## 2021-12-09 NOTE — BRIEF OPERATIVE NOTE - NSICDXBRIEFPREOP_GEN_ALL_CORE_FT
PRE-OP DIAGNOSIS:  Second pregnancy 09-Dec-2021 12:51:19 Full term Kelli Jean-Baptiste  Gestational diabetes 09-Dec-2021 12:52:54 A1 Kelli Jean-Baptiste  Gestational hypertension 09-Dec-2021 12:53:14  Kelli Jean-Baptiste  H/O  section 09-Dec-2021 12:53:52  Kelli Jean-Baptiste

## 2021-12-09 NOTE — BRIEF OPERATIVE NOTE - OPERATION/FINDINGS
Viable male infant, apgar 9/9, wgt 2710, gms  delivered via vaccuum assisted  @ 1032  cephalic presentation  hysterotomy closed in single layer, with friable serosal layer requiring additional sutures for hemostasis  Surgicel powder applied over hysterotomy, otherwise grossly nl uterus, tubes & ovaries    QBL: 638 cc  UOP: 240 cc  IVF:  cc   Dictation Number: [id number]

## 2021-12-09 NOTE — OB PROVIDER DELIVERY SUMMARY - NSSELHIDDEN_OBGYN_ALL_OB_FT
[NS_DeliveryAttending1_OBGYN_ALL_OB_FT:[id number]],[NS_DeliveryAssist1_OBGYN_ALL_OB_FT:[id number]=]

## 2021-12-09 NOTE — OB PROVIDER DELIVERY SUMMARY - NSPROVIDERDELIVERYNOTE_OBGYN_ALL_OB_FT
Viable male infant, apgar 9/9, wgt 2710, gms  delivered via vaccuum assisted  @ 1032  cephalic presentation  hysterotomy closed in single layer, with friable serosal layer requiring additional sutures for hemostasis  Surgicel powder applied over hysterotomy, otherwise grossly nl uterus, tubes & ovaries    QBL: 638 cc  UOP: 240 cc  IVF: 2000 cc   Dictation Number: [id number]   21 @ 12:31

## 2021-12-09 NOTE — DISCHARGE NOTE OB - MATERIALS PROVIDED
[facility] [place] Screening Program/[place]  Immunization Record/Breastfeeding Log/Guide to Postpartum Care/[facility] Hearing Screen Program/Shaken Baby Prevention Handout/Breastfeeding Guide and Packet/Birth Certificate Instructions/Tdap Vaccination (VIS Pub Date: 2012)
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
## 2021-12-09 NOTE — DISCHARGE NOTE OB - MEDICATION SUMMARY - MEDICATIONS TO TAKE
I will START or STAY ON the medications listed below when I get home from the hospital:    prenatal vitamin  -- 1 cap(s) by mouth once a day  -- Indication: For vitamins

## 2021-12-10 LAB
BASOPHILS # BLD AUTO: 0.02 K/UL — SIGNIFICANT CHANGE UP (ref 0–0.2)
BASOPHILS NFR BLD AUTO: 0.1 % — SIGNIFICANT CHANGE UP (ref 0–2)
COVID-19 SPIKE DOMAIN AB INTERP: POSITIVE
COVID-19 SPIKE DOMAIN ANTIBODY RESULT: >250 U/ML — HIGH
EOSINOPHIL # BLD AUTO: 0.04 K/UL — SIGNIFICANT CHANGE UP (ref 0–0.5)
EOSINOPHIL NFR BLD AUTO: 0.3 % — SIGNIFICANT CHANGE UP (ref 0–6)
HCT VFR BLD CALC: 28.2 % — LOW (ref 34.5–45)
HGB BLD-MCNC: 9.7 G/DL — LOW (ref 11.5–15.5)
IANC: 9.67 K/UL — HIGH (ref 1.5–8.5)
IMM GRANULOCYTES NFR BLD AUTO: 0.6 % — SIGNIFICANT CHANGE UP (ref 0–1.5)
LYMPHOCYTES # BLD AUTO: 18.2 % — SIGNIFICANT CHANGE UP (ref 13–44)
LYMPHOCYTES # BLD AUTO: 2.45 K/UL — SIGNIFICANT CHANGE UP (ref 1–3.3)
MCHC RBC-ENTMCNC: 31.6 PG — SIGNIFICANT CHANGE UP (ref 27–34)
MCHC RBC-ENTMCNC: 34.4 GM/DL — SIGNIFICANT CHANGE UP (ref 32–36)
MCV RBC AUTO: 91.9 FL — SIGNIFICANT CHANGE UP (ref 80–100)
MONOCYTES # BLD AUTO: 1.22 K/UL — HIGH (ref 0–0.9)
MONOCYTES NFR BLD AUTO: 9.1 % — SIGNIFICANT CHANGE UP (ref 2–14)
NEUTROPHILS # BLD AUTO: 9.67 K/UL — HIGH (ref 1.8–7.4)
NEUTROPHILS NFR BLD AUTO: 71.7 % — SIGNIFICANT CHANGE UP (ref 43–77)
NRBC # BLD: 0 /100 WBCS — SIGNIFICANT CHANGE UP
NRBC # FLD: 0 K/UL — SIGNIFICANT CHANGE UP
PLATELET # BLD AUTO: 220 K/UL — SIGNIFICANT CHANGE UP (ref 150–400)
RBC # BLD: 3.07 M/UL — LOW (ref 3.8–5.2)
RBC # FLD: 13.7 % — SIGNIFICANT CHANGE UP (ref 10.3–14.5)
SARS-COV-2 IGG+IGM SERPL QL IA: >250 U/ML — HIGH
SARS-COV-2 IGG+IGM SERPL QL IA: POSITIVE
WBC # BLD: 13.48 K/UL — HIGH (ref 3.8–10.5)
WBC # FLD AUTO: 13.48 K/UL — HIGH (ref 3.8–10.5)

## 2021-12-10 RX ORDER — IBUPROFEN 200 MG
600 TABLET ORAL EVERY 6 HOURS
Refills: 0 | Status: DISCONTINUED | OUTPATIENT
Start: 2021-12-10 | End: 2021-12-11

## 2021-12-10 RX ADMIN — Medication 30 MILLIGRAM(S): at 03:03

## 2021-12-10 RX ADMIN — Medication 975 MILLIGRAM(S): at 21:01

## 2021-12-10 RX ADMIN — Medication 975 MILLIGRAM(S): at 21:50

## 2021-12-10 RX ADMIN — HEPARIN SODIUM 5000 UNIT(S): 5000 INJECTION INTRAVENOUS; SUBCUTANEOUS at 05:57

## 2021-12-10 RX ADMIN — HEPARIN SODIUM 5000 UNIT(S): 5000 INJECTION INTRAVENOUS; SUBCUTANEOUS at 18:27

## 2021-12-10 RX ADMIN — Medication 30 MILLIGRAM(S): at 03:33

## 2021-12-10 RX ADMIN — Medication 600 MILLIGRAM(S): at 18:28

## 2021-12-10 RX ADMIN — Medication 600 MILLIGRAM(S): at 10:29

## 2021-12-10 RX ADMIN — Medication 600 MILLIGRAM(S): at 11:05

## 2021-12-10 NOTE — LACTATION INITIAL EVALUATION - NS LACT CON REASON FOR REQ
Infant NPO for circumcision./general questions without assessment/no latch x24 hours/multiparous mom/patient request
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
## 2021-12-10 NOTE — LACTATION INITIAL EVALUATION - LACTATION INTERVENTIONS
Primary RN made aware of consult and plan./initiate/review safe skin-to-skin/initiate/review hand expression/initiate/review pumping guidelines and safe milk handling/initiate/review techniques for position and latch/post discharge community resources provided/review techniques to increase milk supply/review techniques to manage sore nipples/engorgement/initiate/review breast massage/compression/reviewed components of an effective feeding and at least 8 effective feedings per day required/reviewed importance of monitoring infant diapers, the breastfeeding log, and minimum output each day/reviewed strategies to transition to breastfeeding only/reviewed benefits and recommendations for rooming in/reviewed feeding on demand/by cue at least 8 times a day

## 2021-12-11 VITALS
DIASTOLIC BLOOD PRESSURE: 89 MMHG | TEMPERATURE: 99 F | HEART RATE: 100 BPM | RESPIRATION RATE: 17 BRPM | SYSTOLIC BLOOD PRESSURE: 125 MMHG

## 2021-12-11 LAB
HCT VFR BLD CALC: 31.2 % — LOW (ref 34.5–45)
HGB BLD-MCNC: 10.1 G/DL — LOW (ref 11.5–15.5)
MCHC RBC-ENTMCNC: 31.1 PG — SIGNIFICANT CHANGE UP (ref 27–34)
MCHC RBC-ENTMCNC: 32.4 GM/DL — SIGNIFICANT CHANGE UP (ref 32–36)
MCV RBC AUTO: 96 FL — SIGNIFICANT CHANGE UP (ref 80–100)
NRBC # BLD: 0 /100 WBCS — SIGNIFICANT CHANGE UP
NRBC # FLD: 0 K/UL — SIGNIFICANT CHANGE UP
PLATELET # BLD AUTO: 243 K/UL — SIGNIFICANT CHANGE UP (ref 150–400)
RBC # BLD: 3.25 M/UL — LOW (ref 3.8–5.2)
RBC # FLD: 14.4 % — SIGNIFICANT CHANGE UP (ref 10.3–14.5)
WBC # BLD: 12.48 K/UL — HIGH (ref 3.8–10.5)
WBC # FLD AUTO: 12.48 K/UL — HIGH (ref 3.8–10.5)

## 2021-12-11 RX ADMIN — Medication 975 MILLIGRAM(S): at 09:33

## 2021-12-11 RX ADMIN — Medication 600 MILLIGRAM(S): at 06:01

## 2021-12-11 RX ADMIN — Medication 600 MILLIGRAM(S): at 06:45

## 2021-12-11 RX ADMIN — HEPARIN SODIUM 5000 UNIT(S): 5000 INJECTION INTRAVENOUS; SUBCUTANEOUS at 07:44

## 2021-12-11 RX ADMIN — Medication 600 MILLIGRAM(S): at 13:03

## 2021-12-11 RX ADMIN — Medication 975 MILLIGRAM(S): at 10:00

## 2021-12-11 NOTE — PROGRESS NOTE ADULT - ASSESSMENT
A/P: 30yo  gHTN POD#2 s/p rLTCS.  Patient is stable and doing well post-operatively.      - gHTN: BP wnl overnight, continue to monitor q4hrs.  - Continue regular diet.  - Increase ambulation, encourage SCDs when not ambulating, Heparin for DVT ppx  - Continue motrin, tylenol, oxycodone PRN for pain control    MICAELA Kessler PGY1

## 2021-12-11 NOTE — PROGRESS NOTE ADULT - SUBJECTIVE AND OBJECTIVE BOX
ANESTHESIA POSTOP CHECK    31y Female POSTOP DAY 1 S/P     Vital Signs Last 24 Hrs  T(C): 36.8 (10 Dec 2021 05:26), Max: 38.1 (09 Dec 2021 09:45)  T(F): 98.3 (10 Dec 2021 05:26), Max: 100.6 (09 Dec 2021 09:45)  HR: 94 (10 Dec 2021 05:26) (74 - 100)  BP: 130/86 (10 Dec 2021 05:26) (120/80 - 159/139)  BP(mean): 96 (09 Dec 2021 14:00) (88 - 143)  RR: 18 (10 Dec 2021 05:26) (16 - 20)  SpO2: 100% (10 Dec 2021 05:26) (97% - 100%)  I&O's Summary    09 Dec 2021 07:01  -  10 Dec 2021 07:00  --------------------------------------------------------  IN: 2125 mL / OUT: 2063 mL / NET: 62 mL        [X ] NO APPARENT ANESTHESIA COMPLICATIONS      Comments: 
OB Progress Note: LTCS, POD#2    S: 30yo  gHTN POD#2 s/p rLTCS. Patient reports a mild headache that she thinks is from her tight hair bun. Pain is adequately controlled. She is tolerating a regular diet and passing flatus. She is voiding spontaneously, and ambulating without difficulty. Denies CP/SOB. Denies lightheadedness, dizziness, or N/V.    O:  Vitals:  Vital Signs Last 24 Hrs  T(C): 37 (11 Dec 2021 06:21), Max: 37.2 (10 Dec 2021 23:08)  T(F): 98.6 (11 Dec 2021 06:21), Max: 98.9 (10 Dec 2021 23:08)  HR: 100 (11 Dec 2021 06:) (95 - 100)  BP: 127/85 (11 Dec 2021 06:) (118/80 - 128/80)  BP(mean): --  RR: 18 (11 Dec 2021 06:) (16 - 18)  SpO2: 100% (11 Dec 2021 06:) (98% - 100%)    MEDICATIONS  (STANDING):  acetaminophen     Tablet .. 975 milliGRAM(s) Oral <User Schedule>  diphtheria/tetanus/pertussis (acellular) Vaccine (ADAcel) 0.5 milliLiter(s) IntraMuscular once  heparin   Injectable 5000 Unit(s) SubCutaneous every 12 hours  ibuprofen  Tablet. 600 milliGRAM(s) Oral every 6 hours      MEDICATIONS  (PRN):  lanolin Ointment 1 Application(s) Topical every 6 hours PRN Sore Nipples  magnesium hydroxide Suspension 30 milliLiter(s) Oral two times a day PRN Constipation  oxyCODONE    IR 5 milliGRAM(s) Oral every 3 hours PRN Moderate to Severe Pain (4-10)  oxyCODONE    IR 5 milliGRAM(s) Oral once PRN Moderate to Severe Pain (4-10)  simethicone 80 milliGRAM(s) Chew every 4 hours PRN Gas      Labs:  Blood type: AB Positive  Rubella IgG: RPR: Negative                          10.1<L>   12.48<H> >-----------< 243    ( 11 @ 06:25 )             31.2<L>                        9.7<L>   13.48<H> >-----------< 220    ( 12-10 @ 06:51 )             28.2<L>                        11.2<L>   17.85<H> >-----------< 254    (  @ 19:01 )             31.5<L>                        13.3   11.64<H> >-----------< 190    (  @ 08:29 )             40.2    21 @ 08:29      136  |  103  |  7   ----------------------------<  81  4.6   |  20<L>  |  0.51        Ca    9.0      09 Dec 2021 08:29    TPro  6.9  /  Alb  3.4  /  TBili  0.2  /  DBili  x   /  AST  20  /  ALT  7   /  AlkPhos  147<H>  21 @ 08:29          PE:  General: NAD  CV: RRR  Pulm: CTAB  Abdomen: Soft, appropriately tender, incision c/d/i.  Extremities: No calf tenderness, no pitting edema    
Post Op C/S day 1      Pt without complaints    Vital Signs Last 24 Hrs  T(C): 36.8 (10 Dec 2021 05:26), Max: 38.1 (09 Dec 2021 09:45)  T(F): 98.3 (10 Dec 2021 05:26), Max: 100.6 (09 Dec 2021 09:45)  HR: 94 (10 Dec 2021 05:26) (74 - 100)  BP: 130/86 (10 Dec 2021 05:26) (120/80 - 159/139)  BP(mean): 96 (09 Dec 2021 14:00) (88 - 143)  RR: 18 (10 Dec 2021 05:26) (16 - 20)  SpO2: 100% (10 Dec 2021 05:26) (97% - 100%)  MEDICATIONS  (STANDING):  acetaminophen     Tablet .. 975 milliGRAM(s) Oral <User Schedule>  diphtheria/tetanus/pertussis (acellular) Vaccine (ADAcel) 0.5 milliLiter(s) IntraMuscular once  heparin   Injectable 5000 Unit(s) SubCutaneous every 12 hours  ibuprofen  Tablet. 600 milliGRAM(s) Oral every 6 hours  ketorolac   Injectable 30 milliGRAM(s) IV Push every 6 hours  lactated ringers. 1000 milliLiter(s) (125 mL/Hr) IV Continuous <Continuous>  lactated ringers. 1000 milliLiter(s) (125 mL/Hr) IV Continuous <Continuous>  lactated ringers. 1000 milliLiter(s) (75 mL/Hr) IV Continuous <Continuous>  oxytocin Infusion 333.333 milliUNIT(s)/Min (1000 mL/Hr) IV Continuous <Continuous>    MEDICATIONS  (PRN):  lanolin Ointment 1 Application(s) Topical every 6 hours PRN Sore Nipples  magnesium hydroxide Suspension 30 milliLiter(s) Oral two times a day PRN Constipation  oxyCODONE    IR 5 milliGRAM(s) Oral every 3 hours PRN Moderate to Severe Pain (4-10)  oxyCODONE    IR 5 milliGRAM(s) Oral once PRN Moderate to Severe Pain (4-10)  simethicone 80 milliGRAM(s) Chew every 4 hours PRN Gas        Abdomen soft  fundus firm  Incision clean dry and intact  extremities non tender  lochia wnl                          9.7    13.48 )-----------( 220      ( 10 Dec 2021 06:51 )             28.2     Patient doing well    Routine post operative care
OB Progress Note:  Delivery, POD#1    S: 30yo POD#1 s/p LTCS c/b gHTN . Her pain is well controlled. She is tolerating a regular diet and passing flatus. Denies N/V. Denies CP/SOB/lightheadedness/dizziness. She is ambulating without difficulty. Voiding spontaneously. Patient denies HA, changes or spots in vision, new swelling in the hands and face, or RUQ/epigastric pain.     O:   Vital Signs Last 24 Hrs  T(C): 36.8 (10 Dec 2021 05:26), Max: 38.1 (09 Dec 2021 09:45)  T(F): 98.3 (10 Dec 2021 05:26), Max: 100.6 (09 Dec 2021 09:45)  HR: 94 (10 Dec 2021 05:) (74 - 100)  BP: 130/86 (10 Dec 2021 05:) (120/80 - 159/139)  BP(mean): 96 (09 Dec 2021 14:00) (88 - 143)  RR: 18 (10 Dec 2021 05:26) (16 - 20)  SpO2: 100% (10 Dec 2021 05:26) (97% - 100%)    Labs:  Blood type: AB Positive  Rubella IgG: RPR: Negative                          11.2<L>   17.85<H> >-----------< 254    (  @ 19:01 )             31.5<L>                        13.3   11.64<H> >-----------< 190    (  @ 08:29 )             40.2    21 @ 08:29      136  |  103  |  7   ----------------------------<  81  4.6   |  20<L>  |  0.51        Ca    9.0      09 Dec 2021 08:29    TPro  6.9  /  Alb  3.4  /  TBili  0.2  /  DBili  x   /  AST  20  /  ALT  7   /  AlkPhos  147<H>  21 @ 08:29          PE:  General: NAD  Abdomen: Mildly distended, appropriately tender, incision c/d/i. Uterine fundus firm and palpated at midline  Extremities: No erythema, no pitting edema, no calf tenderness bilaterally

## 2021-12-12 ENCOUNTER — NON-APPOINTMENT (OUTPATIENT)
Age: 31
End: 2021-12-12

## 2021-12-12 ENCOUNTER — INPATIENT (INPATIENT)
Facility: HOSPITAL | Age: 31
LOS: 1 days | Discharge: HOME | End: 2021-12-14
Attending: SPECIALIST | Admitting: SPECIALIST
Payer: COMMERCIAL

## 2021-12-12 VITALS
DIASTOLIC BLOOD PRESSURE: 91 MMHG | RESPIRATION RATE: 20 BRPM | HEART RATE: 92 BPM | OXYGEN SATURATION: 100 % | TEMPERATURE: 99 F | SYSTOLIC BLOOD PRESSURE: 141 MMHG

## 2021-12-12 DIAGNOSIS — Z98.890 OTHER SPECIFIED POSTPROCEDURAL STATES: Chronic | ICD-10-CM

## 2021-12-12 DIAGNOSIS — Z98.891 HISTORY OF UTERINE SCAR FROM PREVIOUS SURGERY: Chronic | ICD-10-CM

## 2021-12-12 DIAGNOSIS — O16.9 UNSPECIFIED MATERNAL HYPERTENSION, UNSPECIFIED TRIMESTER: ICD-10-CM

## 2021-12-12 PROBLEM — O24.419 GESTATIONAL DIABETES MELLITUS IN PREGNANCY, UNSPECIFIED CONTROL: Chronic | Status: ACTIVE | Noted: 2021-11-30

## 2021-12-12 PROBLEM — O13.9 GESTATIONAL [PREGNANCY-INDUCED] HYPERTENSION WITHOUT SIGNIFICANT PROTEINURIA, UNSPECIFIED TRIMESTER: Chronic | Status: ACTIVE | Noted: 2021-11-30

## 2021-12-12 PROBLEM — O34.211 MATERNAL CARE FOR LOW TRANSVERSE SCAR FROM PREVIOUS CESAREAN DELIVERY: Chronic | Status: ACTIVE | Noted: 2021-11-30

## 2021-12-12 LAB
ALBUMIN SERPL ELPH-MCNC: 3.3 G/DL — SIGNIFICANT CHANGE UP (ref 3.3–5)
ALP SERPL-CCNC: 99 U/L — SIGNIFICANT CHANGE UP (ref 40–120)
ALT FLD-CCNC: 10 U/L — SIGNIFICANT CHANGE UP (ref 4–33)
ANION GAP SERPL CALC-SCNC: 9 MMOL/L — SIGNIFICANT CHANGE UP (ref 7–14)
APTT BLD: 26.5 SEC — LOW (ref 27–36.3)
AST SERPL-CCNC: 12 U/L — SIGNIFICANT CHANGE UP (ref 4–32)
BASOPHILS # BLD AUTO: 0.04 K/UL — SIGNIFICANT CHANGE UP (ref 0–0.2)
BASOPHILS NFR BLD AUTO: 0.4 % — SIGNIFICANT CHANGE UP (ref 0–2)
BILIRUB SERPL-MCNC: 0.2 MG/DL — SIGNIFICANT CHANGE UP (ref 0.2–1.2)
BLD GP AB SCN SERPL QL: NEGATIVE — SIGNIFICANT CHANGE UP
BUN SERPL-MCNC: 6 MG/DL — LOW (ref 7–23)
CALCIUM SERPL-MCNC: 8.7 MG/DL — SIGNIFICANT CHANGE UP (ref 8.4–10.5)
CHLORIDE SERPL-SCNC: 108 MMOL/L — HIGH (ref 98–107)
CO2 SERPL-SCNC: 24 MMOL/L — SIGNIFICANT CHANGE UP (ref 22–31)
CREAT SERPL-MCNC: 0.53 MG/DL — SIGNIFICANT CHANGE UP (ref 0.5–1.3)
EOSINOPHIL # BLD AUTO: 0.25 K/UL — SIGNIFICANT CHANGE UP (ref 0–0.5)
EOSINOPHIL NFR BLD AUTO: 2.4 % — SIGNIFICANT CHANGE UP (ref 0–6)
FIBRINOGEN PPP-MCNC: 691 MG/DL — HIGH (ref 290–520)
GLUCOSE SERPL-MCNC: 91 MG/DL — SIGNIFICANT CHANGE UP (ref 70–99)
HCT VFR BLD CALC: 29.6 % — LOW (ref 34.5–45)
HGB BLD-MCNC: 9.7 G/DL — LOW (ref 11.5–15.5)
IANC: 6.82 K/UL — SIGNIFICANT CHANGE UP (ref 1.5–8.5)
IMM GRANULOCYTES NFR BLD AUTO: 0.5 % — SIGNIFICANT CHANGE UP (ref 0–1.5)
INR BLD: 1.09 RATIO — SIGNIFICANT CHANGE UP (ref 0.88–1.16)
LDH SERPL L TO P-CCNC: 216 U/L — SIGNIFICANT CHANGE UP (ref 135–225)
LYMPHOCYTES # BLD AUTO: 2.55 K/UL — SIGNIFICANT CHANGE UP (ref 1–3.3)
LYMPHOCYTES # BLD AUTO: 24.7 % — SIGNIFICANT CHANGE UP (ref 13–44)
MAGNESIUM SERPL-MCNC: 4.8 MG/DL — HIGH (ref 1.6–2.6)
MCHC RBC-ENTMCNC: 30.4 PG — SIGNIFICANT CHANGE UP (ref 27–34)
MCHC RBC-ENTMCNC: 32.8 GM/DL — SIGNIFICANT CHANGE UP (ref 32–36)
MCV RBC AUTO: 92.8 FL — SIGNIFICANT CHANGE UP (ref 80–100)
MONOCYTES # BLD AUTO: 0.61 K/UL — SIGNIFICANT CHANGE UP (ref 0–0.9)
MONOCYTES NFR BLD AUTO: 5.9 % — SIGNIFICANT CHANGE UP (ref 2–14)
NEUTROPHILS # BLD AUTO: 6.82 K/UL — SIGNIFICANT CHANGE UP (ref 1.8–7.4)
NEUTROPHILS NFR BLD AUTO: 66.1 % — SIGNIFICANT CHANGE UP (ref 43–77)
NRBC # BLD: 0 /100 WBCS — SIGNIFICANT CHANGE UP
NRBC # FLD: 0 K/UL — SIGNIFICANT CHANGE UP
PLATELET # BLD AUTO: 305 K/UL — SIGNIFICANT CHANGE UP (ref 150–400)
POTASSIUM SERPL-MCNC: 4.2 MMOL/L — SIGNIFICANT CHANGE UP (ref 3.5–5.3)
POTASSIUM SERPL-SCNC: 4.2 MMOL/L — SIGNIFICANT CHANGE UP (ref 3.5–5.3)
PROT SERPL-MCNC: 6.1 G/DL — SIGNIFICANT CHANGE UP (ref 6–8.3)
PROTHROM AB SERPL-ACNC: 12.4 SEC — SIGNIFICANT CHANGE UP (ref 10.6–13.6)
RBC # BLD: 3.19 M/UL — LOW (ref 3.8–5.2)
RBC # FLD: 14.1 % — SIGNIFICANT CHANGE UP (ref 10.3–14.5)
RH IG SCN BLD-IMP: POSITIVE — SIGNIFICANT CHANGE UP
SARS-COV-2 RNA SPEC QL NAA+PROBE: SIGNIFICANT CHANGE UP
SODIUM SERPL-SCNC: 141 MMOL/L — SIGNIFICANT CHANGE UP (ref 135–145)
URATE SERPL-MCNC: 5.6 MG/DL — SIGNIFICANT CHANGE UP (ref 2.5–7)
WBC # BLD: 10.32 K/UL — SIGNIFICANT CHANGE UP (ref 3.8–10.5)
WBC # FLD AUTO: 10.32 K/UL — SIGNIFICANT CHANGE UP (ref 3.8–10.5)

## 2021-12-12 PROCEDURE — 93010 ELECTROCARDIOGRAM REPORT: CPT

## 2021-12-12 RX ORDER — NIFEDIPINE 30 MG
30 TABLET, EXTENDED RELEASE 24 HR ORAL DAILY
Refills: 0 | Status: DISCONTINUED | OUTPATIENT
Start: 2021-12-13 | End: 2021-12-14

## 2021-12-12 RX ORDER — ACETAMINOPHEN 325 MG/1
325 TABLET ORAL
Refills: 0 | Status: ACTIVE | COMMUNITY
Start: 2021-12-12

## 2021-12-12 RX ORDER — IBUPROFEN 200 MG/1
200 TABLET ORAL
Refills: 0 | Status: ACTIVE | COMMUNITY
Start: 2021-12-12

## 2021-12-12 RX ORDER — SODIUM CHLORIDE 9 MG/ML
1000 INJECTION, SOLUTION INTRAVENOUS
Refills: 0 | Status: DISCONTINUED | OUTPATIENT
Start: 2021-12-12 | End: 2021-12-13

## 2021-12-12 RX ORDER — ACETAMINOPHEN 500 MG
975 TABLET ORAL EVERY 6 HOURS
Refills: 0 | Status: DISCONTINUED | OUTPATIENT
Start: 2021-12-12 | End: 2021-12-14

## 2021-12-12 RX ORDER — NIFEDIPINE 30 MG
30 TABLET, EXTENDED RELEASE 24 HR ORAL ONCE
Refills: 0 | Status: COMPLETED | OUTPATIENT
Start: 2021-12-12 | End: 2021-12-12

## 2021-12-12 RX ORDER — MAGNESIUM SULFATE 500 MG/ML
2 VIAL (ML) INJECTION
Qty: 40 | Refills: 0 | Status: DISCONTINUED | OUTPATIENT
Start: 2021-12-12 | End: 2021-12-13

## 2021-12-12 RX ORDER — MAGNESIUM SULFATE 500 MG/ML
2 VIAL (ML) INJECTION
Qty: 40 | Refills: 0 | Status: DISCONTINUED | OUTPATIENT
Start: 2021-12-12 | End: 2021-12-12

## 2021-12-12 RX ORDER — SODIUM CHLORIDE 9 MG/ML
1000 INJECTION, SOLUTION INTRAVENOUS
Refills: 0 | Status: DISCONTINUED | OUTPATIENT
Start: 2021-12-12 | End: 2021-12-12

## 2021-12-12 RX ORDER — ISOPROPYL ALCOHOL 0.7 ML/ML
SWAB TOPICAL
Qty: 2 | Refills: 0 | Status: DISCONTINUED | COMMUNITY
Start: 2021-10-04 | End: 2021-12-12

## 2021-12-12 RX ORDER — MAGNESIUM SULFATE 500 MG/ML
4 VIAL (ML) INJECTION ONCE
Refills: 0 | Status: COMPLETED | OUTPATIENT
Start: 2021-12-12 | End: 2021-12-12

## 2021-12-12 RX ORDER — BLOOD-GLUCOSE METER
W/DEVICE EACH MISCELLANEOUS
Qty: 1 | Refills: 0 | Status: DISCONTINUED | COMMUNITY
Start: 2021-10-04 | End: 2021-12-12

## 2021-12-12 RX ORDER — MAGNESIUM SULFATE 500 MG/ML
4 VIAL (ML) INJECTION ONCE
Refills: 0 | Status: DISCONTINUED | OUTPATIENT
Start: 2021-12-12 | End: 2021-12-12

## 2021-12-12 RX ORDER — LANCETS
EACH MISCELLANEOUS
Qty: 200 | Refills: 0 | Status: DISCONTINUED | COMMUNITY
Start: 2021-10-04 | End: 2021-12-12

## 2021-12-12 RX ORDER — BLOOD SUGAR DIAGNOSTIC
STRIP MISCELLANEOUS 4 TIMES DAILY
Qty: 200 | Refills: 3 | Status: DISCONTINUED | COMMUNITY
Start: 2021-10-04 | End: 2021-12-12

## 2021-12-12 RX ORDER — ASPIRIN 81 MG
81 TABLET, DELAYED RELEASE (ENTERIC COATED) ORAL
Refills: 0 | Status: DISCONTINUED | COMMUNITY
End: 2021-12-12

## 2021-12-12 RX ORDER — SODIUM CHLORIDE 9 MG/ML
3 INJECTION INTRAMUSCULAR; INTRAVENOUS; SUBCUTANEOUS EVERY 8 HOURS
Refills: 0 | Status: DISCONTINUED | OUTPATIENT
Start: 2021-12-12 | End: 2021-12-14

## 2021-12-12 RX ORDER — ACETAMINOPHEN 500 MG
1000 TABLET ORAL ONCE
Refills: 0 | Status: COMPLETED | OUTPATIENT
Start: 2021-12-12 | End: 2021-12-12

## 2021-12-12 RX ADMIN — SODIUM CHLORIDE 3 MILLILITER(S): 9 INJECTION INTRAMUSCULAR; INTRAVENOUS; SUBCUTANEOUS at 18:06

## 2021-12-12 RX ADMIN — Medication 1000 MILLIGRAM(S): at 14:51

## 2021-12-12 RX ADMIN — Medication 1000 MILLIGRAM(S): at 17:03

## 2021-12-12 RX ADMIN — Medication 975 MILLIGRAM(S): at 22:19

## 2021-12-12 RX ADMIN — Medication 975 MILLIGRAM(S): at 23:12

## 2021-12-12 RX ADMIN — Medication 300 GRAM(S): at 16:06

## 2021-12-12 RX ADMIN — Medication 50 GM/HR: at 17:03

## 2021-12-12 RX ADMIN — Medication 30 MILLIGRAM(S): at 13:40

## 2021-12-12 RX ADMIN — Medication 50 GM/HR: at 22:31

## 2021-12-12 NOTE — CHART NOTE - NSCHARTNOTEFT_GEN_A_CORE
31 year old female  s/p RCS 12/9  GHTN  pt stated that she was dx as GHTN at 36 weeks and was on no meds and stated started feeling   chest discomfort since last night and denied any SOB  cough fever chills denied any nausea emesis    denied any headache dizziness palpitations     stated took BP this am   /98 and then 136/102  and was advised by service  to come to triage       medhx GHTN   surghx CS x2  meds pnvqd   allergies  multiple allergies       pt seen and examined   BP in triage  138/95  138/90  142/94   143/91   [facility] labs sent 31 year old female  s/p RCS 12/9  GHTN  pt stated that she was dx as GHTN at 36 weeks and was on no meds and stated started feeling   chest discomfort since last night and denied any SOB  cough fever chills denied any nausea emesis    denied any headache dizziness palpitations     stated took BP this am   /98 and then 136/102  and was advised by service  to come to triage       medhx GHTN   surghx CS x2  meds pnvqd   allergies  multiple allergies       pt seen and examined   1230p BP in triage  138/95  138/90  142/94   143/91    1230p [facility] labs sent      130p  BP ranges reviewed with Dr Whittington and Dr Huffman.    BP  138/95 138/90 142/94 143/91 146/94 148/103 147/101   procardia XL 30 mgs ordered    continued observation  of BP    awaiting  [facility] labs 31 year old female  s/p RCS 12/9  GHTN  pt stated that she was dx as GHTN at 36 weeks and was on no meds and stated started feeling   chest discomfort since last night and denied any SOB  cough fever chills denied any nausea emesis    denied any headache dizziness palpitations     stated took BP this am   /98 and then 136/102  and was advised by service  to come to triage       medhx GHTN   surghx CS x2  meds pnvqd   allergies  multiple allergies       pt seen and examined   1230p BP in triage  138/95  138/90  142/94   143/91    1230p [facility] labs sent      130p  BP ranges reviewed with Dr Whittington and Dr Huffman.    BP  138/95 138/90 142/94 143/91 146/94 148/103 147/101   procardia XL 30 mgs ordered    continued observation  of BP    awaiting  Hell[facility] labs  1430p   pt c/o incisional pain  Tylenol given for pain management    BP still elevated  BP max 160/99      1515p  BP range still elevated  140//105- 151/95- 160//91 156/96    Hellp labs normal   case d/w Dr Whittington  pt admitted for magnesium and observation     Lisa HOUGH

## 2021-12-12 NOTE — PATIENT PROFILE ADULT - FALL HARM RISK - UNIVERSAL INTERVENTIONS
Bed in lowest position, wheels locked, appropriate side rails in place/Call bell, personal items and telephone in reach/Instruct patient to call for assistance before getting out of bed or chair/Non-slip footwear when patient is out of bed/[place] to call system/Physically safe environment - no spills, clutter or unnecessary equipment/Purposeful Proactive Rounding/Room/bathroom lighting operational, light cord in reach

## 2021-12-12 NOTE — H&P ADULT - HISTORY OF PRESENT ILLNESS
32 year old female s/p [facility] 12/9 scheduled for GHTN  at 37.5 weeks  denied any HTN meds currently    stated was discharged yesterday and noted epigastric to chest discomfort since last night and denied any SOB  headaches dizziness palpitations    denied any nausea emesis diarrhea  denied any  fever chills dysuria cough rhinorrhea    denied any Covid 19 exposure     stated postop pain controlled with Tylenol  stated mild lochia and is BF

## 2021-12-12 NOTE — H&P ADULT - NSHPLABSRESULTS_GEN_ALL_CORE
9.7    10.32 )-----------( 305      ( 12 Dec 2021 12:57 )             29.6     12-12    141  |  108<H>  |  6<L>  ----------------------------<  91  4.2   |  24  |  0.53    Ca    8.7      12 Dec 2021 12:57    TPro  6.1  /  Alb  3.3  /  TBili  0.2  /  DBili  x   /  AST  12  /  ALT  10  /  AlkPhos  99  12-12

## 2021-12-12 NOTE — H&P ADULT - NSHPPHYSICALEXAM_GEN_ALL_CORE
pt seen and examined   ICU Vital Signs Last 24 Hrs  BP range  130-160/   P    pt alert OX3   lungs clear   heart S1 S2   abd soft firm fundus   extremities 2 + edema   reflexia 2 +

## 2021-12-12 NOTE — H&P ADULT - ASSESSMENT
31 year old female  s/p RCS 12/9  GHTN  pt stated that she was dx as GHTN at 36 weeks and was on no meds and stated started feeling   chest discomfort since last night and denied any SOB  cough fever chills denied any nausea emesis    denied any headache dizziness palpitations     stated took BP this am   /98 and then 136/102  and was advised by service  to come to triage       medhx GHTN   surghx CS x2  meds pnvqd   allergies  multiple allergies       pt seen and examined   1230p BP in triage  138/95  138/90  142/94   143/91    1230p [facility] labs sent      130p  BP ranges reviewed with Dr Whittington and Dr Huffman.    BP  138/95 138/90 142/94 143/91 146/94 148/103 147/101   procardia XL 30 mgs ordered    continued observation  of BP    awaiting  Hellp labs  1430p   pt c/o incisional pain  Tylenol given for pain management    BP still elevated  BP max 160/99      1515p  BP range still elevated  140//105- 151/95- 160//91 156/96    Hellp labs normal   case d/w Dr Whittington  pt admitted for magnesium and observation     Lisa HOUGH

## 2021-12-13 LAB
ALBUMIN SERPL ELPH-MCNC: 3.3 G/DL — SIGNIFICANT CHANGE UP (ref 3.3–5)
ALP SERPL-CCNC: 117 U/L — SIGNIFICANT CHANGE UP (ref 40–120)
ALT FLD-CCNC: 9 U/L — SIGNIFICANT CHANGE UP (ref 4–33)
ANION GAP SERPL CALC-SCNC: 11 MMOL/L — SIGNIFICANT CHANGE UP (ref 7–14)
APTT BLD: 26.1 SEC — LOW (ref 27–36.3)
AST SERPL-CCNC: 11 U/L — SIGNIFICANT CHANGE UP (ref 4–32)
BASOPHILS # BLD AUTO: 0.04 K/UL — SIGNIFICANT CHANGE UP (ref 0–0.2)
BASOPHILS NFR BLD AUTO: 0.3 % — SIGNIFICANT CHANGE UP (ref 0–2)
BILIRUB SERPL-MCNC: 0.3 MG/DL — SIGNIFICANT CHANGE UP (ref 0.2–1.2)
BUN SERPL-MCNC: 4 MG/DL — LOW (ref 7–23)
CALCIUM SERPL-MCNC: 7.5 MG/DL — LOW (ref 8.4–10.5)
CHLORIDE SERPL-SCNC: 99 MMOL/L — SIGNIFICANT CHANGE UP (ref 98–107)
CO2 SERPL-SCNC: 24 MMOL/L — SIGNIFICANT CHANGE UP (ref 22–31)
CREAT SERPL-MCNC: 0.44 MG/DL — LOW (ref 0.5–1.3)
EOSINOPHIL # BLD AUTO: 0.24 K/UL — SIGNIFICANT CHANGE UP (ref 0–0.5)
EOSINOPHIL NFR BLD AUTO: 1.7 % — SIGNIFICANT CHANGE UP (ref 0–6)
FIBRINOGEN PPP-MCNC: 772 MG/DL — HIGH (ref 290–520)
GLUCOSE SERPL-MCNC: 105 MG/DL — HIGH (ref 70–99)
HCT VFR BLD CALC: 33.5 % — LOW (ref 34.5–45)
HGB BLD-MCNC: 11.3 G/DL — LOW (ref 11.5–15.5)
IANC: 10.52 K/UL — HIGH (ref 1.5–8.5)
IMM GRANULOCYTES NFR BLD AUTO: 0.7 % — SIGNIFICANT CHANGE UP (ref 0–1.5)
INR BLD: 1.04 RATIO — SIGNIFICANT CHANGE UP (ref 0.88–1.16)
LDH SERPL L TO P-CCNC: 218 U/L — SIGNIFICANT CHANGE UP (ref 135–225)
LYMPHOCYTES # BLD AUTO: 16.8 % — SIGNIFICANT CHANGE UP (ref 13–44)
LYMPHOCYTES # BLD AUTO: 2.33 K/UL — SIGNIFICANT CHANGE UP (ref 1–3.3)
MAGNESIUM SERPL-MCNC: 5.9 MG/DL — HIGH (ref 1.6–2.6)
MCHC RBC-ENTMCNC: 31 PG — SIGNIFICANT CHANGE UP (ref 27–34)
MCHC RBC-ENTMCNC: 33.7 GM/DL — SIGNIFICANT CHANGE UP (ref 32–36)
MCV RBC AUTO: 92 FL — SIGNIFICANT CHANGE UP (ref 80–100)
MONOCYTES # BLD AUTO: 0.67 K/UL — SIGNIFICANT CHANGE UP (ref 0–0.9)
MONOCYTES NFR BLD AUTO: 4.8 % — SIGNIFICANT CHANGE UP (ref 2–14)
NEUTROPHILS # BLD AUTO: 10.52 K/UL — HIGH (ref 1.8–7.4)
NEUTROPHILS NFR BLD AUTO: 75.7 % — SIGNIFICANT CHANGE UP (ref 43–77)
NRBC # BLD: 0 /100 WBCS — SIGNIFICANT CHANGE UP
NRBC # FLD: 0 K/UL — SIGNIFICANT CHANGE UP
PLATELET # BLD AUTO: 357 K/UL — SIGNIFICANT CHANGE UP (ref 150–400)
POTASSIUM SERPL-MCNC: 3.8 MMOL/L — SIGNIFICANT CHANGE UP (ref 3.5–5.3)
POTASSIUM SERPL-SCNC: 3.8 MMOL/L — SIGNIFICANT CHANGE UP (ref 3.5–5.3)
PROT SERPL-MCNC: 5.9 G/DL — LOW (ref 6–8.3)
PROTHROM AB SERPL-ACNC: 11.8 SEC — SIGNIFICANT CHANGE UP (ref 10.6–13.6)
RBC # BLD: 3.64 M/UL — LOW (ref 3.8–5.2)
RBC # FLD: 13.9 % — SIGNIFICANT CHANGE UP (ref 10.3–14.5)
SODIUM SERPL-SCNC: 134 MMOL/L — LOW (ref 135–145)
URATE SERPL-MCNC: 6.1 MG/DL — SIGNIFICANT CHANGE UP (ref 2.5–7)
WBC # BLD: 13.9 K/UL — HIGH (ref 3.8–10.5)
WBC # FLD AUTO: 13.9 K/UL — HIGH (ref 3.8–10.5)

## 2021-12-13 RX ORDER — IBUPROFEN 200 MG
600 TABLET ORAL EVERY 6 HOURS
Refills: 0 | Status: DISCONTINUED | OUTPATIENT
Start: 2021-12-13 | End: 2021-12-14

## 2021-12-13 RX ORDER — HEPARIN SODIUM 5000 [USP'U]/ML
5000 INJECTION INTRAVENOUS; SUBCUTANEOUS EVERY 12 HOURS
Refills: 0 | Status: DISCONTINUED | OUTPATIENT
Start: 2021-12-13 | End: 2021-12-14

## 2021-12-13 RX ORDER — DIPHENHYDRAMINE HCL 50 MG
25 CAPSULE ORAL ONCE
Refills: 0 | Status: DISCONTINUED | OUTPATIENT
Start: 2021-12-13 | End: 2021-12-13

## 2021-12-13 RX ORDER — METOCLOPRAMIDE HCL 10 MG
10 TABLET ORAL ONCE
Refills: 0 | Status: DISCONTINUED | OUTPATIENT
Start: 2021-12-13 | End: 2021-12-14

## 2021-12-13 RX ADMIN — Medication 600 MILLIGRAM(S): at 07:55

## 2021-12-13 RX ADMIN — SODIUM CHLORIDE 3 MILLILITER(S): 9 INJECTION INTRAMUSCULAR; INTRAVENOUS; SUBCUTANEOUS at 20:14

## 2021-12-13 RX ADMIN — Medication 600 MILLIGRAM(S): at 20:00

## 2021-12-13 RX ADMIN — HEPARIN SODIUM 5000 UNIT(S): 5000 INJECTION INTRAVENOUS; SUBCUTANEOUS at 21:29

## 2021-12-13 RX ADMIN — Medication 975 MILLIGRAM(S): at 04:36

## 2021-12-13 RX ADMIN — Medication 975 MILLIGRAM(S): at 05:30

## 2021-12-13 RX ADMIN — Medication 30 MILLIGRAM(S): at 12:16

## 2021-12-13 RX ADMIN — Medication 600 MILLIGRAM(S): at 22:00

## 2021-12-14 ENCOUNTER — TRANSCRIPTION ENCOUNTER (OUTPATIENT)
Age: 31
End: 2021-12-14

## 2021-12-14 ENCOUNTER — NON-APPOINTMENT (OUTPATIENT)
Age: 31
End: 2021-12-14

## 2021-12-14 VITALS
DIASTOLIC BLOOD PRESSURE: 93 MMHG | SYSTOLIC BLOOD PRESSURE: 125 MMHG | HEART RATE: 104 BPM | TEMPERATURE: 98 F | OXYGEN SATURATION: 99 % | RESPIRATION RATE: 18 BRPM

## 2021-12-14 RX ORDER — NIFEDIPINE 30 MG
1 TABLET, EXTENDED RELEASE 24 HR ORAL
Qty: 30 | Refills: 0
Start: 2021-12-14 | End: 2022-01-12

## 2021-12-14 RX ORDER — IBUPROFEN 200 MG
1 TABLET ORAL
Qty: 0 | Refills: 0 | DISCHARGE
Start: 2021-12-14

## 2021-12-14 RX ORDER — ACETAMINOPHEN 500 MG
3 TABLET ORAL
Qty: 0 | Refills: 0 | DISCHARGE
Start: 2021-12-14

## 2021-12-14 RX ADMIN — Medication 30 MILLIGRAM(S): at 11:20

## 2021-12-14 RX ADMIN — Medication 975 MILLIGRAM(S): at 02:44

## 2021-12-14 RX ADMIN — Medication 975 MILLIGRAM(S): at 02:14

## 2021-12-14 RX ADMIN — Medication 600 MILLIGRAM(S): at 07:00

## 2021-12-14 RX ADMIN — Medication 600 MILLIGRAM(S): at 06:43

## 2021-12-14 RX ADMIN — SODIUM CHLORIDE 3 MILLILITER(S): 9 INJECTION INTRAMUSCULAR; INTRAVENOUS; SUBCUTANEOUS at 05:27

## 2021-12-14 NOTE — PROGRESS NOTE ADULT - REASON FOR ADMISSION
Post partum POD #3  elevated BP at home and epigastric/chest discomfort

## 2021-12-14 NOTE — DISCHARGE NOTE PROVIDER - HOSPITAL COURSE
32yo POD#5 readmit with sPEC (BP, HA).  Patient received Magnesium therapy.  Blood pressures controlled with Procardia 30XL.  Patient discharged in stable condition tolerating PO, voiding spontaneously, ambulating without difficulty, without signs or symptoms of PEC.

## 2021-12-14 NOTE — PROGRESS NOTE ADULT - ASSESSMENT
30yo POD#5 readmit with sPEC (BP, HA)    #sPEC  - s/p Mg  - c/w Procardia 30  - HELLP labs wnl    #PP  - Reg  - routine care  - HSQ, SCD  - discharge planning    Lopez Maciel, PGY3
32yo POD#4 readmit with sPEC (BP, HA)    #sPEC  - s/p Mg  - c/w Procardia 30, will consider increase to 60XL daily  - HELLP labs wnl    #PP  - Reg  - routine care  - HSQ, SCD    Lopez Maciel, PG

## 2021-12-14 NOTE — PROGRESS NOTE ADULT - SUBJECTIVE AND OBJECTIVE BOX
OB Postpartum Note:   Postpartum Readmit.  Delivery, POD#4    Interval events:  Pain is well controlled.  Headache improved with tylenol.  She is tolerating a regular diet and passing flatus.   She is voiding spontaneously.  Denies CP/SOB/epigastric pain. Denies lightheadedness/dizziness. Denies N/V.    O:  Vitals:  Vital Signs Last 24 Hrs  T(C): 36.9 (13 Dec 2021 06:29), Max: 37.2 (12 Dec 2021 22:43)  T(F): 98.5 (13 Dec 2021 06:29), Max: 98.9 (12 Dec 2021 22:43)  HR: 95 (13 Dec 2021 06:29) (92 - 107)  BP: 119/80 (13 Dec 2021 06:29) (119/80 - 142/96)  BP(mean): 93 (12 Dec 2021 21:30) (93 - 106)  RR: 16 (13 Dec 2021 06:29) (14 - 22)  SpO2: 98% (13 Dec 2021 06:29) (97% - 100%)    MEDICATIONS  (STANDING):  NIFEdipine XL 30 milliGRAM(s) Oral daily  sodium chloride 0.9% lock flush 3 milliLiter(s) IV Push every 8 hours    MEDICATIONS  (PRN):  acetaminophen     Tablet .. 975 milliGRAM(s) Oral every 6 hours PRN Mild Pain (1 - 3)  ibuprofen  Tablet. 600 milliGRAM(s) Oral every 6 hours PRN Moderate Pain (4 - 6)      LABS:  Blood type: AB Positive  Rubella IgG: RPR: Negative                          11.3<L>   13.90<H> >-----------< 357    (  @ 02:35 )             33.5<L>                        9.7<L>   10.32 >-----------< 305    (  @ 12:57 )             29.6<L>                        10.1<L>   12.48<H> >-----------< 243    (  @ 06:25 )             31.2<L>    21 @ 02:35      134<L>  |  99  |  4<L>  ----------------------------<  105<H>  3.8   |  24  |  0.44<L>    21 @ 12:57      141  |  108<H>  |  6<L>  ----------------------------<  91  4.2   |  24  |  0.53        Ca    7.5<L>      13 Dec 2021 02:35  Ca    8.7      12 Dec 2021 12:57  Mg     5.90<H>       Mg     4.80<H>         TPro  5.9<L>  /  Alb  3.3  /  TBili  0.3  /  DBili  x   /  AST  11  /  ALT  9   /  AlkPhos  117  21 @ 02:35  TPro  6.1  /  Alb  3.3  /  TBili  0.2  /  DBili  x   /  AST  12  /  ALT  10  /  AlkPhos  99  21 @ 12:57          Physical exam:  Gen: NAD  Abdomen: Soft, nontender, no distension , firm uterine fundus at umbilicus.  Ext: No calf tenderness            
Subjective:  Pt doing well. but c/o ha, despite lower bp"s  Pt states pain is managed well.  Pt denies fever, chills, chest pain, SOB, nausea, vomiting, lightheadedness, dizziness.  Pt [] ambulating, tolerating ___ diet, [] flatus, []BM      Objective:  T(F): 98.2 (12-13-21 @ 02:29), Max: 98.9 (12-12-21 @ 22:43)  HR: 106 (12-13-21 @ 04:32) (92 - 107)  BP: 125/88 (12-13-21 @ 04:32) (121/97 - 142/96)  RR: 16 (12-13-21 @ 04:32) (14 - 22)  SpO2: 99% (12-13-21 @ 04:32) (97% - 100%)  Wt(kg): --  I&O's Summary    12 Dec 2021 07:01  -  13 Dec 2021 06:20  --------------------------------------------------------  IN: 775 mL / OUT: 2100 mL / NET: -1325 mL        MEDICATIONS  (STANDING):  lactated ringers. 1000 milliLiter(s) (50 mL/Hr) IV Continuous <Continuous>  magnesium sulfate Infusion 2 Gm/Hr (50 mL/Hr) IV Continuous <Continuous>  NIFEdipine XL 30 milliGRAM(s) Oral daily  sodium chloride 0.9% lock flush 3 milliLiter(s) IV Push every 8 hours    MEDICATIONS  (PRN):  acetaminophen     Tablet .. 975 milliGRAM(s) Oral every 6 hours PRN Mild Pain (1 - 3)  ibuprofen  Tablet. 600 milliGRAM(s) Oral every 6 hours PRN Moderate Pain (4 - 6)      Physical Exam:  Constitutional: NAD   Abdomen: Soft, mildly tender, [] distended, no guarding, no rebound, [] bowel sounds  Incision: clean, dry, intact  Extremities: no lower extremity edema or calf tenderness   Lochia minimal    LABS:                        11.3   13.90 )-----------( 357      ( 13 Dec 2021 02:35 )             33.5                         9.7    10.32 )-----------( 305      ( 12 Dec 2021 12:57 )             29.6                         10.1   12.48 )-----------( 243      ( 11 Dec 2021 06:25 )             31.2     12-13    134<L>  |  99  |  4<L>  ----------------------------<  105<H>  3.8   |  24  |  0.44<L>    Ca    7.5<L>      13 Dec 2021 02:35  Mg     5.90     12-13    TPro  5.9<L>  /  Alb  3.3  /  TBili  0.3  /  DBili  x   /  AST  11  /  ALT  9   /  AlkPhos  117  12-13    PT/INR - ( 13 Dec 2021 02:35 )   PT: 11.8 sec;   INR: 1.04 ratio         PTT - ( 13 Dec 2021 02:35 )  PTT:26.1 sec      RADIOLOGY & ADDITIONAL TESTS:    Assessment and Plan:  Pt POD#   s/p    - continue postoperative care  - encourage ambulation
OB Postpartum Note:   Postpartum Readmit.  Delivery, POD#5    Interval events:  Pain is well controlled.  Headache resolved  She is tolerating a regular diet and passing flatus.   She is voiding spontaneously.  Denies CP/SOB/epigastric pain. Denies lightheadedness/dizziness. Denies N/V.    Vital Signs Last 24 Hours  T(C): 36.7 (21 @ 05:22), Max: 36.9 (21 @ 13:39)  HR: 99 (21 @ 05:22) (96 - 120)  BP: 129/98 (21 @ 05:22) (122/81 - 135/87)  RR: 18 (21 @ 05:22) (16 - 18)  SpO2: 98% (21 @ 05:22) (98% - 100%)    CAPILLARY BLOOD GLUCOSE          Physical Exam:  General: NAD  Ext: No pain or swelling      Labs:             11.3   13.90 )-----------( 357      (  @ 02:35 )             33.5      @ 02:35    134  |  99  |  4   ----------------------------<  105  3.8   |  24  |  0.44    Ca    7.5       02:35  Mg     5.90      @ 02:35    TPro  5.9  /  Alb  3.3  /  TBili  0.3  /  DBili  x   /  AST  11  /  ALT  9   /  AlkPhos  117   @ 02:35    PT/INR - (  @ 02:35 )   PT: 11.8 sec;   INR: 1.04 ratio    PTT - (  @ 02:35 )  PTT:26.1 sec    Uric Acid: ( @ 02:35)  6.1      Fibrinogen: ( @ 02:35)  772      LDH: ( @ 02:35)  218        MEDICATIONS  (STANDING):  heparin   Injectable 5000 Unit(s) SubCutaneous every 12 hours  metoclopramide 10 milliGRAM(s) Oral once  NIFEdipine XL 30 milliGRAM(s) Oral daily  sodium chloride 0.9% lock flush 3 milliLiter(s) IV Push every 8 hours    MEDICATIONS  (PRN):  acetaminophen     Tablet .. 975 milliGRAM(s) Oral every 6 hours PRN Mild Pain (1 - 3)  ibuprofen  Tablet. 600 milliGRAM(s) Oral every 6 hours PRN Moderate Pain (4 - 6)

## 2021-12-14 NOTE — DISCHARGE NOTE PROVIDER - NSDCFUADDINST_GEN_ALL_CORE_FT
Follow-up with your doctor in 2-3 days to check your blood pressure.  Continue to check your blood pressure at home.  Take Procardia medication as prescribed. Call your doctor if your blood pressure is greater than or equal to 160 systolic (top number) of 110 diastolic (bottom number) or if you experience a headache unrelieved by OTC medications, blurred vision, or difficulty breathing.

## 2021-12-14 NOTE — DISCHARGE NOTE PROVIDER - NSDCMRMEDTOKEN_GEN_ALL_CORE_FT
acetaminophen 325 mg oral tablet: 3 tab(s) orally every 6 hours, As needed, Mild Pain (1 - 3)  ibuprofen 600 mg oral tablet: 1 tab(s) orally every 6 hours, As needed, Moderate Pain (4 - 6)  NIFEdipine 30 mg oral tablet, extended release: 1 tab(s) orally once a day

## 2021-12-14 NOTE — DISCHARGE NOTE NURSING/CASE MANAGEMENT/SOCIAL WORK - PATIENT PORTAL LINK FT
You can access the FollowMyHealth Patient Portal offered by [facility] by registering at the following website: [email or web address]. By joining [facility]’s FollowMyHealth portal, you will also be able to view your health information using other applications (apps) compatible with our system.

## 2021-12-14 NOTE — DISCHARGE NOTE PROVIDER - NSDCCPCAREPLAN_GEN_ALL_CORE_FT
PRINCIPAL DISCHARGE DIAGNOSIS  Diagnosis: Preeclampsia in postpartum period  Assessment and Plan of Treatment:

## 2021-12-14 NOTE — DISCHARGE NOTE NURSING/CASE MANAGEMENT/SOCIAL WORK - NSDCPEFALRISK_GEN_ALL_CORE
For information on Fall & Injury Prevention, visit: [email or web address] OR  [email or web address] OR  https://www.cdc.gov/steadi/patient.html

## 2021-12-14 NOTE — DISCHARGE NOTE PROVIDER - CARE PROVIDER_API CALL
Chilango Dove (MD)  Obstetrics and Gynecology  [address] [place] [address]  Phone: [phone]  Fax: [phone]  Established Patient  Follow Up Time: Routine

## 2021-12-15 ENCOUNTER — NON-APPOINTMENT (OUTPATIENT)
Age: 31
End: 2021-12-15

## 2021-12-20 RX ORDER — NIFEDIPINE 30 MG/1
30 TABLET, EXTENDED RELEASE ORAL DAILY
Refills: 0 | Status: DISCONTINUED | COMMUNITY
Start: 2021-12-15 | End: 2021-12-20

## 2021-12-21 ENCOUNTER — TRANSCRIPTION ENCOUNTER (OUTPATIENT)
Age: 31
End: 2021-12-21

## 2021-12-21 ENCOUNTER — NON-APPOINTMENT (OUTPATIENT)
Age: 31
End: 2021-12-21

## 2021-12-26 ENCOUNTER — NON-APPOINTMENT (OUTPATIENT)
Age: 31
End: 2021-12-26

## 2022-01-03 ENCOUNTER — NON-APPOINTMENT (OUTPATIENT)
Age: 32
End: 2022-01-03

## 2022-01-07 ENCOUNTER — NON-APPOINTMENT (OUTPATIENT)
Age: 32
End: 2022-01-07

## 2022-01-16 LAB — SURGICAL PATHOLOGY STUDY: SIGNIFICANT CHANGE UP

## 2022-02-04 DIAGNOSIS — Z87.59 PERSONAL HISTORY OF OTHER COMPLICATIONS OF PREGNANCY, CHILDBIRTH AND THE PUERPERIUM: ICD-10-CM

## 2022-02-04 RX ORDER — IRON/IRON ASP GLY/FA/MV-MIN 38 125-25-1MG
TABLET ORAL
Refills: 0 | Status: DISCONTINUED | COMMUNITY
End: 2022-02-04

## 2022-02-04 RX ORDER — NIFEDIPINE 30 MG/1
30 TABLET, EXTENDED RELEASE ORAL DAILY
Refills: 0 | Status: DISCONTINUED | COMMUNITY
End: 2022-02-04

## 2022-02-05 ENCOUNTER — INPATIENT (INPATIENT)
Facility: HOSPITAL | Age: 32
LOS: 1 days | Discharge: HOME | End: 2022-02-07
Attending: SPECIALIST | Admitting: SPECIALIST
Payer: COMMERCIAL

## 2022-02-05 VITALS
RESPIRATION RATE: 17 BRPM | TEMPERATURE: 99 F | DIASTOLIC BLOOD PRESSURE: 87 MMHG | OXYGEN SATURATION: 100 % | HEART RATE: 89 BPM | SYSTOLIC BLOOD PRESSURE: 133 MMHG | HEIGHT: 60 IN

## 2022-02-05 DIAGNOSIS — Z98.890 OTHER SPECIFIED POSTPROCEDURAL STATES: Chronic | ICD-10-CM

## 2022-02-05 DIAGNOSIS — Z98.891 HISTORY OF UTERINE SCAR FROM PREVIOUS SURGERY: Chronic | ICD-10-CM

## 2022-02-05 LAB
ALBUMIN SERPL ELPH-MCNC: 3.8 G/DL — SIGNIFICANT CHANGE UP (ref 3.3–5)
ALP SERPL-CCNC: 125 U/L — HIGH (ref 40–120)
ALT FLD-CCNC: 11 U/L — SIGNIFICANT CHANGE UP (ref 4–33)
ANION GAP SERPL CALC-SCNC: 11 MMOL/L — SIGNIFICANT CHANGE UP (ref 7–14)
APTT BLD: 31.2 SEC — SIGNIFICANT CHANGE UP (ref 27–36.3)
AST SERPL-CCNC: 10 U/L — SIGNIFICANT CHANGE UP (ref 4–32)
BASOPHILS # BLD AUTO: 0.05 K/UL — SIGNIFICANT CHANGE UP (ref 0–0.2)
BASOPHILS NFR BLD AUTO: 0.4 % — SIGNIFICANT CHANGE UP (ref 0–2)
BILIRUB SERPL-MCNC: 0.2 MG/DL — SIGNIFICANT CHANGE UP (ref 0.2–1.2)
BUN SERPL-MCNC: 8 MG/DL — SIGNIFICANT CHANGE UP (ref 7–23)
CALCIUM SERPL-MCNC: 9 MG/DL — SIGNIFICANT CHANGE UP (ref 8.4–10.5)
CHLORIDE SERPL-SCNC: 102 MMOL/L — SIGNIFICANT CHANGE UP (ref 98–107)
CO2 SERPL-SCNC: 25 MMOL/L — SIGNIFICANT CHANGE UP (ref 22–31)
CREAT SERPL-MCNC: 0.6 MG/DL — SIGNIFICANT CHANGE UP (ref 0.5–1.3)
EOSINOPHIL # BLD AUTO: 0.18 K/UL — SIGNIFICANT CHANGE UP (ref 0–0.5)
EOSINOPHIL NFR BLD AUTO: 1.5 % — SIGNIFICANT CHANGE UP (ref 0–6)
FLUAV AG NPH QL: SIGNIFICANT CHANGE UP
FLUBV AG NPH QL: SIGNIFICANT CHANGE UP
GLUCOSE SERPL-MCNC: 93 MG/DL — SIGNIFICANT CHANGE UP (ref 70–99)
HCT VFR BLD CALC: 31.6 % — LOW (ref 34.5–45)
HGB BLD-MCNC: 10.3 G/DL — LOW (ref 11.5–15.5)
IANC: 7.36 K/UL — SIGNIFICANT CHANGE UP (ref 1.5–8.5)
IMM GRANULOCYTES NFR BLD AUTO: 0.3 % — SIGNIFICANT CHANGE UP (ref 0–1.5)
INR BLD: 1.35 RATIO — HIGH (ref 0.88–1.16)
LYMPHOCYTES # BLD AUTO: 29.2 % — SIGNIFICANT CHANGE UP (ref 13–44)
LYMPHOCYTES # BLD AUTO: 3.45 K/UL — HIGH (ref 1–3.3)
MAGNESIUM SERPL-MCNC: 2 MG/DL — SIGNIFICANT CHANGE UP (ref 1.6–2.6)
MCHC RBC-ENTMCNC: 29 PG — SIGNIFICANT CHANGE UP (ref 27–34)
MCHC RBC-ENTMCNC: 32.6 GM/DL — SIGNIFICANT CHANGE UP (ref 32–36)
MCV RBC AUTO: 89 FL — SIGNIFICANT CHANGE UP (ref 80–100)
MONOCYTES # BLD AUTO: 0.75 K/UL — SIGNIFICANT CHANGE UP (ref 0–0.9)
MONOCYTES NFR BLD AUTO: 6.3 % — SIGNIFICANT CHANGE UP (ref 2–14)
NEUTROPHILS # BLD AUTO: 7.36 K/UL — SIGNIFICANT CHANGE UP (ref 1.8–7.4)
NEUTROPHILS NFR BLD AUTO: 62.3 % — SIGNIFICANT CHANGE UP (ref 43–77)
NRBC # BLD: 0 /100 WBCS — SIGNIFICANT CHANGE UP
NRBC # FLD: 0 K/UL — SIGNIFICANT CHANGE UP
PHOSPHATE SERPL-MCNC: 3.8 MG/DL — SIGNIFICANT CHANGE UP (ref 2.5–4.5)
PLATELET # BLD AUTO: 477 K/UL — HIGH (ref 150–400)
POTASSIUM SERPL-MCNC: 4 MMOL/L — SIGNIFICANT CHANGE UP (ref 3.5–5.3)
POTASSIUM SERPL-SCNC: 4 MMOL/L — SIGNIFICANT CHANGE UP (ref 3.5–5.3)
PROT SERPL-MCNC: 7.5 G/DL — SIGNIFICANT CHANGE UP (ref 6–8.3)
PROTHROM AB SERPL-ACNC: 15.3 SEC — HIGH (ref 10.6–13.6)
RBC # BLD: 3.55 M/UL — LOW (ref 3.8–5.2)
RBC # FLD: 12.5 % — SIGNIFICANT CHANGE UP (ref 10.3–14.5)
RSV RNA NPH QL NAA+NON-PROBE: SIGNIFICANT CHANGE UP
SARS-COV-2 RNA SPEC QL NAA+PROBE: SIGNIFICANT CHANGE UP
SODIUM SERPL-SCNC: 138 MMOL/L — SIGNIFICANT CHANGE UP (ref 135–145)
WBC # BLD: 11.83 K/UL — HIGH (ref 3.8–10.5)
WBC # FLD AUTO: 11.83 K/UL — HIGH (ref 3.8–10.5)

## 2022-02-05 PROCEDURE — 99285 EMERGENCY DEPT VISIT HI MDM: CPT

## 2022-02-05 RX ORDER — ACETAMINOPHEN 500 MG
975 TABLET ORAL ONCE
Refills: 0 | Status: COMPLETED | OUTPATIENT
Start: 2022-02-05 | End: 2022-02-05

## 2022-02-05 RX ORDER — SODIUM CHLORIDE 9 MG/ML
1000 INJECTION, SOLUTION INTRAVENOUS ONCE
Refills: 0 | Status: COMPLETED | OUTPATIENT
Start: 2022-02-05 | End: 2022-02-05

## 2022-02-05 RX ORDER — IBUPROFEN 200 MG
400 TABLET ORAL ONCE
Refills: 0 | Status: COMPLETED | OUTPATIENT
Start: 2022-02-05 | End: 2022-02-05

## 2022-02-05 RX ADMIN — SODIUM CHLORIDE 1000 MILLILITER(S): 9 INJECTION, SOLUTION INTRAVENOUS at 21:57

## 2022-02-05 RX ADMIN — Medication 975 MILLIGRAM(S): at 21:57

## 2022-02-05 RX ADMIN — Medication 400 MILLIGRAM(S): at 21:57

## 2022-02-05 NOTE — ED PROVIDER NOTE - OBJECTIVE STATEMENT
42F  s/p  x2 (most recent 21), presents for abd pain, sent in by OBGYN Dr. Dove for outpatient CT showing "...4.8 x 4.0 cm thick-walled- fluid collection located within the hysterotomy defect, and a 3.7 x 3.1 cm thick -walled fluid collection in the left paramedian anterior abdominal wall at the same level." 42F  s/p  x2 (most recent 21), presents for abd pain, sent in by OBGYN Dr. Dove for outpatient CT showing "...4.8 x 4.0 cm thick-walled- fluid collection located within the hysterotomy defect, and a 3.7 x 3.1 cm thick -walled fluid collection in the left paramedian anterior abdominal wall at the same level." Patient has been having steadily increasing abd pain for the last 1.5-2 weeks, was seen by OBGYN on Tuesday, had an elevated WCC, noted palpable abd mass, sent for CT on Thursday which showed the above. Was started on Cipro by Dr. Dove and was told to come to the hospital for potential IR drainage. Denies fever/chills, CP, SOB, emesis, dysuria, vaginal discharge.

## 2022-02-05 NOTE — ED PROVIDER NOTE - PROGRESS NOTE DETAILS
FRANCE Mendosa- received sign out. as per gyn suggesting to admit to Dr. Gold. pt amenable for admission

## 2022-02-05 NOTE — ED PROVIDER NOTE - PHYSICAL EXAMINATION
GENERAL: well appearing in no acute distress, non-toxic appearing  HEAD: normocephalic, atraumatic  HEENT: normal conjunctiva, oral mucosa moist, uvula midline, no tonsilar exudates, no JVD  CARDIAC: regular rate and rhythm, normal S1S2, no appreciable murmurs, 2+ pulses in UE/LE b/l  PULM: normal breath sounds, clear to ascultation bilaterally, no rales, rhonchi, wheezing  GI: Abd soft, distended, shaun-umbilical tenderness, no rebound tenderness, no guarding, no rigidity, palpable mass approx 4 cm superior to  incision site which is healing well c/d/i.  : no CVA tenderness b/l, no suprapubic tenderness  NEURO: no focal motor or sensory deficits, CN2-12 intact, normal speech, PERRLA, EOMI, normal gait, AAOx3  MSK: ROM intact, no peripheral edema, no calf tenderness b/l  SKIN: well-perfused, extremities warm, no visible rashes  PSYCH: appropriate mood and affect
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
## 2022-02-05 NOTE — ED PROVIDER NOTE - CLINICAL SUMMARY MEDICAL DECISION MAKING FREE TEXT BOX
42F  s/p  x2 (most recent 21), presents for abd pain, sent in by OBGYN Dr. Dove for outpatient CT showing "...4.8 x 4.0 cm thick-walled- fluid collection located within the hysterotomy defect, and a 3.7 x 3.1 cm thick -walled fluid collection in the left paramedian anterior abdominal wall at the same level." Patient has been having steadily increasing abd pain for the last 1.5-2 weeks, was seen by OBGYN on Tuesday, had an elevated WCC, noted palpable abd mass, sent for CT on Thursday which showed the above. Was started on Cipro by Dr. Dove and was told to come to the hospital for potential IR drainage. Denies fever/chills, CP, SOB, emesis, dysuria, vaginal discharge. AVSS. Palpable mass 4 cm superior to  incision site, tender to palpation without rebound or rigidity. CT scan impression c/f infected hematoma, will work up including pre-procedure labs, give pain meds, fluids and reassess, OBGYN consult, IR consult, likely admission to OBGYN. Mickey att: 42F  s/p  x2 (most recent 21), presents for abd pain, sent in by OBGYN Dr. Dove for outpatient CT showing "...4.8 x 4.0 cm thick-walled- fluid collection located within the hysterotomy defect, and a 3.7 x 3.1 cm thick -walled fluid collection in the left paramedian anterior abdominal wall at the same level." Patient has been having steadily increasing abd pain for the last 1.5-2 weeks, was seen by OBGYN on Tuesday, had an elevated WCC, noted palpable abd mass, sent for CT on Thursday which showed the above. Was started on Cipro by Dr. Dove and was told to come to the hospital for potential IR drainage. Denies fever/chills, CP, SOB, emesis, dysuria, vaginal discharge. AVSS. Palpable mass 4 cm superior to  incision site, tender to palpation without rebound or rigidity. CT scan impression c/f infected hematoma, will work up including pre-procedure labs, give pain meds, fluids and reassess, OBGYN consult, IR consult, likely admission to OBGYN.

## 2022-02-05 NOTE — CONSULT NOTE ADULT - SUBJECTIVE AND OBJECTIVE BOX
Interventional Radiology    Evaluate for Procedure:     HPI: 31y Female with a history of  5 weeks ago with worsening pelvic pain, had CT abdomen/pelvis at [facility] on 2/3, concern for superinfected pelvic hematomas. IR consulted for aspiration/ drainage.     Allergies: Benadryl (Anaphylaxis)  ceftriaxone (Hives (Mild to Mod))  Cipro (Diarrhea; Nausea)  erythromycin (Diarrhea; Nausea)  erythromycin (Unknown)    Medications (Abx/Cardiac/Anticoagulation/Blood Products)      Data:  152.4  T(C): 37  HR: 89  BP: 133/87  RR: 17  SpO2: 100%    -WBC 13.90 / HgB 11.3 / Hct 33.5 / Plt 357  -Na 134 / Cl 99 / BUN 4 / Glucose 105  -K 3.8 / CO2 24 / Cr 0.44  -ALT 9 / Alk Phos 117 / T.Bili 0.3  -INR 1.04 / PTT 26.1      Radiology:     Assessment/Plan:   -- CT images from [facility] reviewed, collections are too small for drainage catheter placement but one of the collections is ammenable to percutaneous needle aspiration pending how patient responds to antibiotic treatment  -- Recommend starting antibiotic treatment, monitor patient tomorrow, will touch base with primary team to see how patient is doing before officially scheduling collection aspiration  --Patient has mild leukocytosis, WBC 12, afebrile and hemodynamically stable  --If aspiration needed tomorrow, --patient does NOT need to be NPO for procedure, performed with local lidocaine  -- hold a.m. anticoagulation for DVTPx  -- please maintain COVID PCR within 72 hours of planned procedure   -- page IR at [phone] with questions/ concerns

## 2022-02-05 NOTE — ED PROVIDER NOTE - NS ED ROS FT
General: denies fever, chills  HENT: denies nasal congestion, rhinorrhea  Eyes: denies visual changes, blurred vision  CV: denies chest pain, palpitations  Resp: denies difficulty breathing, cough  Abdominal: +nausea, denies vomiting, diarrhea, + shaun-umbilical abdominal pain  : denies urinary pain or discharge  MSK: denies muscle aches, leg swelling  Neuro: denies headaches, numbness, tingling  Skin: denies rashes, bruises

## 2022-02-06 DIAGNOSIS — T14.8XXA OTHER INJURY OF UNSPECIFIED BODY REGION, INITIAL ENCOUNTER: ICD-10-CM

## 2022-02-06 LAB
BLD GP AB SCN SERPL QL: NEGATIVE — SIGNIFICANT CHANGE UP
RH IG SCN BLD-IMP: POSITIVE — SIGNIFICANT CHANGE UP

## 2022-02-06 RX ORDER — IBUPROFEN 200 MG
600 TABLET ORAL EVERY 6 HOURS
Refills: 0 | Status: DISCONTINUED | OUTPATIENT
Start: 2022-02-06 | End: 2022-02-07

## 2022-02-06 RX ORDER — METRONIDAZOLE 500 MG
500 TABLET ORAL EVERY 8 HOURS
Refills: 0 | Status: DISCONTINUED | OUTPATIENT
Start: 2022-02-06 | End: 2022-02-07

## 2022-02-06 RX ORDER — ACETAMINOPHEN 500 MG
975 TABLET ORAL EVERY 6 HOURS
Refills: 0 | Status: DISCONTINUED | OUTPATIENT
Start: 2022-02-06 | End: 2022-02-07

## 2022-02-06 RX ORDER — CIPROFLOXACIN LACTATE 400MG/40ML
500 VIAL (ML) INTRAVENOUS EVERY 12 HOURS
Refills: 0 | Status: DISCONTINUED | OUTPATIENT
Start: 2022-02-06 | End: 2022-02-07

## 2022-02-06 RX ORDER — ACETAMINOPHEN 500 MG
1000 TABLET ORAL ONCE
Refills: 0 | Status: COMPLETED | OUTPATIENT
Start: 2022-02-06 | End: 2022-02-06

## 2022-02-06 RX ORDER — CIPROFLOXACIN LACTATE 400MG/40ML
VIAL (ML) INTRAVENOUS
Refills: 0 | Status: DISCONTINUED | OUTPATIENT
Start: 2022-02-06 | End: 2022-02-06

## 2022-02-06 RX ADMIN — Medication 500 MILLIGRAM(S): at 06:27

## 2022-02-06 RX ADMIN — Medication 500 MILLIGRAM(S): at 13:34

## 2022-02-06 RX ADMIN — Medication 400 MILLIGRAM(S): at 19:11

## 2022-02-06 RX ADMIN — Medication 500 MILLIGRAM(S): at 21:53

## 2022-02-06 RX ADMIN — Medication 1000 MILLIGRAM(S): at 19:41

## 2022-02-06 NOTE — H&P ADULT - HISTORY OF PRESENT ILLNESS
*incomplete note* TAWNY GILMORE  31y  Female [id number]    HPI: Pt is a 32yo  LMP  presenting to the ED for abdominal pain. Pt is postpartum from [facility] on 2021, c/b postpartum severe preeclampsia s/p magnesium & antihypertensive treatment. Pt states 2 weeks ago she began experiencing pain & pressure around her c/s incision site. Pt was seen by Dr. Dove who prescribed amoxicillin for cellulitis, with improvement in pain after course of antibiotics. Pt again began experiencing the same pressure & pain a week ago & palpated a mass along her c/s site. Pt seen again by Dr. Dove Tuesday of this week who sent pt for CTAP to r/o hematoma & started pt on ciprofloxacin. CTAP resulted Thursday revealing "4.8x4cm thick walled loculated fluid collection w/i hysterotomy defect & 3.7x3.1cm thick walled fluid collection in left paramedian abdominal wall". Pt instructed to come to ED by Dr. Dove Friday for evaluation for drainage. Pt endorses mild nausea & decreased appetite. Pt denies fevers, chills, chest pain, SOB, vaginal bleeding, vaginal discharge.     Name of GYN Physician: Derrell  OB[facility]: [facility] s/p Mg & antihypertensives, - MAB s/p D&C, 2021- [facility] c/b pp sPEC s/p Mg & procardia 60   PMH: denies  PSH: C/Sx2, D&Cx2  Meds: none  Allx: benadryl- anaphylaxis, ceftriaxone- hives  Social History:  Denies smoking use, drug use, alcohol use.   +occasional social alcohol use    Vital Signs Last 24 Hrs  T(C): 36.6 (2022 23:55), Max: 37 (2022 19:59)  T(F): 97.9 (2022 23:55), Max: 98.6 (2022 19:59)  HR: 78 (2022 23:55) (78 - 89)  BP: 116/74 (2022 23:55) (116/74 - 133/87)  BP(mean): --  RR: 16 (2022 23:55) (16 - 17)  SpO2: 100% (2022 23:55) (100% - 100%)    Physical Exam:   General: sitting comfortably in bed, NAD   Lungs: nonlabored breathing on RA   Abd: Soft, 5cm tender mass palpated centrally superior to c/s scar.      LABS:                        10.3   11.83 )-----------( 477      ( 2022 22:48 )             31.6     02-    138  |  102  |  8   ----------------------------<  93  4.0   |  25  |  0.60    Ca    9.0      2022 22:48  Phos  3.8     02-05  Mg     2.00     02-05    TPro  7.5  /  Alb  3.8  /  TBili  0.2  /  DBili  x   /  AST  10  /  ALT  11  /  AlkPhos  125<H>  02-05    I&O's Detail    PT/INR - ( 2022 22:48 )   PT: 15.3 sec;   INR: 1.35 ratio         PTT - ( 2022 22:48 )  PTT:31.2 sec

## 2022-02-06 NOTE — PATIENT PROFILE ADULT - DOES PATIENT HAVE ADVANCE DIRECTIVE
History     Chief Complaint   Patient presents with     Pharyngitis     trouble chewing and red sore in mouth tonight.      HPI  Gt Sampson is a 6 year old male who is accompanied by his mother for evaluation of sore throat. Symptoms started today. Patient complained of pain with chewing scrambled eggs today. Mother looked into his mouth and noted the right posterior pharynx was red. No significant cough or congestion. No fevers. Otherwise healthy and current on immunizations.    Allergies:  No Known Allergies    Problem List:    There are no active problems to display for this patient.       Past Medical History:    No past medical history on file.    Past Surgical History:    No past surgical history on file.    Family History:    No family history on file.    Social History:  Marital Status:  Single [1]  Social History     Tobacco Use     Smoking status: Not on file   Substance Use Topics     Alcohol use: Not on file     Drug use: Not on file        Medications:    No current outpatient medications on file.        Review of Systems   Constitutional: Negative for appetite change and fever.   HENT: Positive for sore throat. Negative for congestion and ear pain.    Respiratory: Negative for cough and shortness of breath.    Cardiovascular: Negative.    Gastrointestinal: Negative.    Genitourinary: Negative.    Musculoskeletal: Negative.    Skin: Negative.    Neurological: Negative.    All other systems reviewed and are negative.      Physical Exam   Pulse: 88  Temp: 96.4  F (35.8  C)  Resp: 18  Weight: 24.7 kg (54 lb 6.4 oz)  SpO2: 99 %      Physical Exam  GENERAL APPEARANCE: healthy, alert and no acute distress  EYES: conjunctiva clear  HENT: external ears and canals normal. Right TM normal. Left TM normal. Nose normal. Moist mucous membranes. Posterior oropharynx erythema without exudate.  Uvula is midline.  No unilateral peritonsillar swelling. No trismus  RESP: lungs clear to auscultation - no rales,  No rhonchi or wheezes  CV: regular rates and rhythm, no murmur noted    ED Course        Procedures            Results for orders placed or performed during the hospital encounter of 11/29/20 (from the past 24 hour(s))   Streptococcus A Rapid Scr w Reflx to PCR    Specimen: Throat   Result Value Ref Range    Strep Specimen Description Throat     Streptococcus Group A Rapid Screen Negative NEG^Negative       Medications - No data to display    Assessments & Plan (with Medical Decision Making)     6 year old healthy male with 24 hours of sore throat, increased with eating food/swallowing. On exam patient is afebrile. Playful and active. No tachycardia. Lung sounds CTA. No hypoxia. No respiratory distress. Posterior oropharynx erythema without exudate. RST is negative. Throat culture pending. Offered Covid testing but parent declined at this time. Likely a viral pharyngitis. Instructed to push fluids. Tylenol or Ibuprofen for pain/fevers. Recheck for worsening.      I have reviewed the nursing notes.    I have reviewed the findings, diagnosis, plan and need for follow up with the patient.      New Prescriptions    No medications on file       Final diagnoses:   Acute pharyngitis, unspecified etiology       11/29/2020   [facility] EMERGENCY DEPT     Luna Parikh APRN CNP  11/29/20 1919

## 2022-02-06 NOTE — PATIENT PROFILE ADULT - FALL HARM RISK - UNIVERSAL INTERVENTIONS
Bed in lowest position, wheels locked, appropriate side rails in place/Call bell, personal items and telephone in reach/Instruct patient to call for assistance before getting out of bed or chair/Non-slip footwear when patient is out of bed/[place] to call system/Physically safe environment - no spills, clutter or unnecessary equipment/Purposeful Proactive Rounding/Room/bathroom lighting operational, light cord in reach

## 2022-02-06 NOTE — PROCEDURE NOTE - PROCEDURE FINDINGS AND DETAILS
Status post lower abdominal drainage catheter placement, 8.5 Maltese  US showed heterogenous midline anterior pelvic collection, enlarged in size compared to prior CT from [facility]  Manual aspiration of a total of 55 cc of dark red clotted blood--likely old hematoma, no gross evidence of superinfection  sample sent for culture Status post lower abdominal drainage catheter placement, 8.5 Turkmen  US showed heterogenous midline anterior pelvic collection, enlarged in size compared to prior CT from [facility]  Manual aspiration of a total of 55 cc of dark red blood--likely old hematoma, no gross evidence of superinfection  sample sent for culture

## 2022-02-06 NOTE — CHART NOTE - NSCHARTNOTEFT_GEN_A_CORE
GYN IR Procedure Note    31y Female admitted with fluid collection s/p .     Procedure: Percutaneous drainage of pelvic collection.    IR Attending Physician: Dr. Guerra    Ordering Attending Physician: Dr. Dove    PMH:  Menstrual Disorder    Anemia B Twelve Deficiency    h/o Gall Bladder Disease    No pertinent past medical history    HELLP syndrome    COVID-19 vaccine series completed    Maternal care for low transverse scar from previous  delivery    Gestational diabetes    Gestational hypertension      Labs:              10.3   11.83 )-----------( 477      ( 2022 22:48 )             31.6       138  |  102  |  8   ----------------------------<  93  4.0   |  25  |  0.60    Ca    9.0      2022 22:48  Phos  3.8     02-  Mg     2.00         TPro  7.5  /  Alb  3.8  /  TBili  0.2  /  DBili  x   /  AST  10  /  ALT  11  /  AlkPhos  125<H>  02-05    LIVER FUNCTIONS - ( 2022 22:48 )  Alb: 3.8 g/dL / Pro: 7.5 g/dL / ALK PHOS: 125 U/L / ALT: 11 U/L / AST: 10 U/L / GGT: x           PT/INR - ( 2022 22:48 )   PT: 15.3 sec;   INR: 1.35 ratio       PTT - ( 2022 22:48 )  PTT:31.2 sec    [place] PGY2

## 2022-02-06 NOTE — H&P ADULT - ASSESSMENT
Pt is a 32yo  LMP  presenting to the ED for abdominal pain. Pt is postpartum from [facility] on 2021, c/b postpartum severe preeclampsia s/p magnesium & antihypertensive treatment. Pt w/ imaging findings of 2 hematomas along c/s site.     -Pt to be admitted for IR drainage of hematomas  -IR consult placed by ED, preprocedure note to be written  -NPO  -IVF  -tylenol/motrin for pain control  -continue ciprofloxacin     Irineo Adams, PGY2  D/w Dr. Gold Pt is a 32yo  LMP  presenting to the ED for abdominal pain. Pt is postpartum from [facility] on 2021, c/b postpartum severe preeclampsia s/p magnesium & antihypertensive treatment. Pt w/ imaging findings of 2 hematomas along c/s site. C/f infected hematoma.    -Pt to be admitted for IV abx & possible IR drainage of fluid collection  -IR consult placed by ED, preprocedure note to be written prior   -IVF  -tylenol/motrin for pain control  -continue ciprofloxacin     Irineo Adams, PGY2  D/w Dr. Gold Pt is a 30yo  LMP  presenting to the ED for abdominal pain. Pt is [facility] from [facility] on 2021, c/b postpartum severe preeclampsia s/p magnesium & antihypertensive treatment. Pt w/ imaging findings of 2 hematomas along c/s site. C/f infected hematoma.    -Pt to be admitted for IV abx & possible IR drainage of fluid collection  -IR consult placed by ED, preprocedure note to be written prior   -IVF  -tylenol/motrin for pain control  -continue ciprofloxacin, add flagyl  -monitor symptoms & WBC, if pts symptoms continue after IV abx, IR to drain fluid collection    Irineo Adams, PGY2  D/w Dr. Gold Pt is a 32yo  LMP  presenting to the ED for abdominal pain. Pt is postpartum from [facility] on 2021, c/b postpartum severe preeclampsia s/p magnesium & antihypertensive treatment. Pt w/ imaging findings of 2 hematomas along c/s site. C/f infected hematoma. Pt s/p 5 days oral ciprofloxacin w/o improvement of pain.    -Pt to be admitted for IR aspiration of fluid collection  -IR consult placed by ED, preprocedure note to be written prior   -confirmed with IR that pt will be seen 2/ for aspiration  -IVF  -tylenol/motrin for pain control  -continue ciprofloxacin    Irineo Adams, PGY2  D/w Dr. Gold

## 2022-02-06 NOTE — PROGRESS NOTE ADULT - ASSESSMENT
30yo  LMP  presenting to the ED for abdominal pain admitted for IR aspiration of fluid collection. Pt is postpartum from [facility] on 2021, c/b postpartum severe preeclampsia s/p magnesium & antihypertensive treatment. Pt w/ imaging findings of 2 hematomas along c/s site. C/f infected hematoma. Pt s/p 5 days oral ciprofloxacin w/o improvement of pain.  - No acute events overnight  -IR consult placed by ED, preprocedure note to be written prior   -confirmed with IR that pt will be seen 2/6 for aspiration  -IVF  -tylenol/motrin for pain control  -continue Ciprofloxacin and Flagyl    Mvula pgy2

## 2022-02-06 NOTE — PROGRESS NOTE ADULT - ATTENDING COMMENTS
32yo  LMP  presenting to the ED for abdominal pain admitted for IR aspiration of fluid collection. Pt is postpartum from [facility] on 2021, c/b postpartum severe preeclampsia s/p magnesium & antihypertensive treatment. Pt w/ imaging findings of 2 hematomas along c/s site. C/f infected hematoma. Pt s/p 5 days oral ciprofloxacin w/o improvement of pain.    Plan   continue antibiotic    IR drainge of  hematoma

## 2022-02-06 NOTE — PRE PROCEDURE NOTE - PRE PROCEDURE EVALUATION
HPI:   32yo  LMP  presenting to the ED for abdominal pain. Pt is postpartum from [facility] on 2021, c/b postpartum severe preeclampsia s/p magnesium & antihypertensive treatment. Pt states 2 weeks ago she began experiencing pain & pressure around her c/s incision site. Pt was seen by Dr. Dove who prescribed amoxicillin for cellulitis, with improvement in pain after course of antibiotics. Pt again began experiencing the same pressure & pain a week ago & palpated a mass along her c/s site. Pt seen again by Dr. Dove Tuesday of this week who sent pt for CTAP to r/o hematoma & started pt on ciprofloxacin. CTAP resulted Thursday revealing "4.8x4cm thick walled loculated fluid collection w/i hysterotomy defect & 3.7x3.1cm thick walled fluid collection in left paramedian abdominal wall". Pt instructed to come to ED by Dr. Dove Friday for evaluation for drainage.                       10.3   11.83 )-----------( 477      ( 2022 22:48 )             31.6     02-    138  |  102  |  8   ----------------------------<  93  4.0   |  25  |  0.60    Ca    9.0      2022 22:48  Phos  3.8     02-05  Mg     2.00     -    TPro  7.5  /  Alb  3.8  /  TBili  0.2  /  DBili  x   /  AST  10  /  ALT  11  /  AlkPhos  125<H>  02-05    I&O's Detail    PT/INR - ( 2022 22:48 )   PT: 15.3 sec;   INR: 1.35 ratio         PTT - ( 2022 22:48 )  PTT:31.2 sec      Allergies:Benadryl (Anaphylaxis)  ceftriaxone (Hives (Mild to Mod))  Cipro (Diarrhea; Nausea)  erythromycin (Diarrhea; Nausea)  erythromycin (Unknown)  fish (Other)  penicillin (Unknown)      PAST MEDICAL & SURGICAL HISTORY:  Menstrual Disorder    Anemia B Twelve Deficiency    h/o Gall Bladder Disease    HELLP syndrome  2016    COVID-19 vaccine series completed    Maternal care for low transverse scar from previous  delivery    Gestational diabetes  , diet controlled    Gestational hypertension  , Not on any meds.    h/o cholecystectomy   laproscopic silvia    History of   2016    History of D&amp;C  2009          Pertinent labs:                      10.3   11.83 )-----------( 477      ( 2022 22:48 )             31.6       138  |  102  |  8   ----------------------------<  93  4.0   |  25  |  0.60    Ca    9.0      2022 22:48  Phos  3.8       Mg     2.00         TPro  7.5  /  Alb  3.8  /  TBili  0.2  /  DBili  x   /  AST  10  /  ALT  11  /  AlkPhos  125<H>    PT/INR - ( 2022 22:48 )   PT: 15.3 sec;   INR: 1.35 ratio         PTT - ( 2022 22:48 )  PTT:31.2 sec    Consent: Procedure/risks/ Benefits explained. Informed consent obtained. Pt verbalizes understanding.            HPI:   30yo  LMP  presenting to the ED for abdominal pain. Pt is postpartum from [facility] on 2021, c/b postpartum severe preeclampsia s/p magnesium & antihypertensive treatment. Pt states 2 weeks ago she began experiencing pain & pressure around her c/s incision site. Pt was seen by Dr. Dove who prescribed amoxicillin for cellulitis, with improvement in pain after course of antibiotics. Pt again began experiencing the same pressure & pain a week ago & palpated a mass along her c/s site. Pt seen again by Dr. Dove Tuesday of this week who sent pt for CTAP to r/o hematoma & started pt on ciprofloxacin. CTAP resulted Thursday revealing "4.8x4cm thick walled loculated fluid collection w/i hysterotomy defect & 3.7x3.1cm thick walled fluid collection in left paramedian abdominal wall". Pt instructed to come to ED by Dr. Dove Friday for evaluation for drainage.        Antibiotics: Flaygl PO and Cipro PO   Anticoagulation: None                     10.3   11.83 )-----------( 477      ( 2022 22:48 )             31.6     02-    138  |  102  |  8   ----------------------------<  93  4.0   |  25  |  0.60    Ca    9.0      2022 22:48  Phos  3.8     02-05  Mg     2.00     02-05    TPro  7.5  /  Alb  3.8  /  TBili  0.2  /  DBili  x   /  AST  10  /  ALT  11  /  AlkPhos  125<H>  02-05    I&O's Detail    PT/INR - ( 2022 22:48 )   PT: 15.3 sec;   INR: 1.35 ratio         PTT - ( 2022 22:48 )  PTT:31.2 sec      Allergies:Benadryl (Anaphylaxis)  ceftriaxone (Hives (Mild to Mod))  Cipro (Diarrhea; Nausea)  erythromycin (Diarrhea; Nausea)  erythromycin (Unknown)  fish (Other)  penicillin (Unknown)      PAST MEDICAL & SURGICAL HISTORY:  Menstrual Disorder    Anemia B Twelve Deficiency    h/o Gall Bladder Disease    HELLP syndrome  2016    COVID-19 vaccine series completed    Maternal care for low transverse scar from previous  delivery    Gestational diabetes  , diet controlled    Gestational hypertension  , Not on any meds.    h/o cholecystectomy   laproscopic silvia    History of   2016    History of D&amp;C  2009          Pertinent labs:                      10.3   11.83 )-----------( 477      ( 2022 22:48 )             31.6       138  |  102  |  8   ----------------------------<  93  4.0   |  25  |  0.60    Ca    9.0      2022 22:48  Phos  3.8     02-  Mg     2.00     -05    TPro  7.5  /  Alb  3.8  /  TBili  0.2  /  DBili  x   /  AST  10  /  ALT  11  /  AlkPhos  125<H>  02-05  PT/INR - ( 2022 22:48 )   PT: 15.3 sec;   INR: 1.35 ratio         PTT - ( 2022 22:48 )  PTT:31.2 sec    Consent: Procedure/risks/ Benefits explained. Informed consent obtained. Pt verbalizes understanding.

## 2022-02-06 NOTE — PROGRESS NOTE ADULT - SUBJECTIVE AND OBJECTIVE BOX
R2 GYN Progress NoteHD#2    Patient seen and examined at bedside.  No acute events overnight. No acute complaints.  Pain well controlled.Patient is ambulating and voiding spontaneously. Denies CP, SOB, N/V, fevers, and chills.    Vital Signs Last 24 Hours  T(C): 36.4 (02-06-22 @ 04:50), Max: 37 (02-05-22 @ 19:59)  HR: 67 (02-06-22 @ 04:50) (67 - 89)  BP: 123/79 (02-06-22 @ 04:50) (116/74 - 133/87)  RR: 18 (02-06-22 @ 04:50) (15 - 18)  SpO2: 100% (02-06-22 @ 04:50) (100% - 100%)    Physical Exam:  General: NAD  CV: RR, S1, S2, no M/R/G  Lungs: CTA b/l, good air flow b/l   Abdomen: Soft, mildly tender, normoactive bowel sounds  Incision: Pfannenstiel CDI with tenderness and firmness at midline  Ext: No pain or swelling     Labs:             10.3   11.83 )-----------( 477      ( 05 Feb 2022 22:48 )             31.6   baso 0.4    eos 1.5    imm gran 0.3    lymph 29.2   mono 6.3    poly 62.3       MEDICATIONS  (STANDING):  ciprofloxacin     Tablet 500 milliGRAM(s) Oral every 12 hours  metroNIDAZOLE    Tablet 500 milliGRAM(s) Oral every 8 hours    MEDICATIONS  (PRN):  acetaminophen     Tablet .. 975 milliGRAM(s) Oral every 6 hours PRN Mild Pain (1 - 3), Moderate Pain (4 - 6)  ibuprofen  Tablet. 600 milliGRAM(s) Oral every 6 hours PRN Moderate Pain (4 - 6)

## 2022-02-07 ENCOUNTER — TRANSCRIPTION ENCOUNTER (OUTPATIENT)
Age: 32
End: 2022-02-07

## 2022-02-07 VITALS
TEMPERATURE: 98 F | DIASTOLIC BLOOD PRESSURE: 78 MMHG | OXYGEN SATURATION: 98 % | RESPIRATION RATE: 18 BRPM | HEART RATE: 74 BPM | SYSTOLIC BLOOD PRESSURE: 120 MMHG

## 2022-02-07 LAB
BASOPHILS # BLD AUTO: 0.03 K/UL — SIGNIFICANT CHANGE UP (ref 0–0.2)
BASOPHILS NFR BLD AUTO: 0.3 % — SIGNIFICANT CHANGE UP (ref 0–2)
EOSINOPHIL # BLD AUTO: 0.14 K/UL — SIGNIFICANT CHANGE UP (ref 0–0.5)
EOSINOPHIL NFR BLD AUTO: 1.5 % — SIGNIFICANT CHANGE UP (ref 0–6)
GRAM STN FLD: SIGNIFICANT CHANGE UP
HCT VFR BLD CALC: 32.8 % — LOW (ref 34.5–45)
HGB BLD-MCNC: 10.5 G/DL — LOW (ref 11.5–15.5)
IANC: 6.2 K/UL — SIGNIFICANT CHANGE UP (ref 1.5–8.5)
IMM GRANULOCYTES NFR BLD AUTO: 0.3 % — SIGNIFICANT CHANGE UP (ref 0–1.5)
LYMPHOCYTES # BLD AUTO: 2.65 K/UL — SIGNIFICANT CHANGE UP (ref 1–3.3)
LYMPHOCYTES # BLD AUTO: 27.8 % — SIGNIFICANT CHANGE UP (ref 13–44)
MCHC RBC-ENTMCNC: 28.3 PG — SIGNIFICANT CHANGE UP (ref 27–34)
MCHC RBC-ENTMCNC: 32 GM/DL — SIGNIFICANT CHANGE UP (ref 32–36)
MCV RBC AUTO: 88.4 FL — SIGNIFICANT CHANGE UP (ref 80–100)
MONOCYTES # BLD AUTO: 0.48 K/UL — SIGNIFICANT CHANGE UP (ref 0–0.9)
MONOCYTES NFR BLD AUTO: 5 % — SIGNIFICANT CHANGE UP (ref 2–14)
NEUTROPHILS # BLD AUTO: 6.2 K/UL — SIGNIFICANT CHANGE UP (ref 1.8–7.4)
NEUTROPHILS NFR BLD AUTO: 65.1 % — SIGNIFICANT CHANGE UP (ref 43–77)
NRBC # BLD: 0 /100 WBCS — SIGNIFICANT CHANGE UP
NRBC # FLD: 0 K/UL — SIGNIFICANT CHANGE UP
PLATELET # BLD AUTO: 459 K/UL — HIGH (ref 150–400)
RBC # BLD: 3.71 M/UL — LOW (ref 3.8–5.2)
RBC # FLD: 12.6 % — SIGNIFICANT CHANGE UP (ref 10.3–14.5)
SPECIMEN SOURCE: SIGNIFICANT CHANGE UP
WBC # BLD: 9.53 K/UL — SIGNIFICANT CHANGE UP (ref 3.8–10.5)
WBC # FLD AUTO: 9.53 K/UL — SIGNIFICANT CHANGE UP (ref 3.8–10.5)

## 2022-02-07 PROCEDURE — 49406 IMAGE CATH FLUID PERI/RETRO: CPT

## 2022-02-07 RX ORDER — METRONIDAZOLE 500 MG
1 TABLET ORAL
Qty: 11 | Refills: 0
Start: 2022-02-07

## 2022-02-07 RX ORDER — HEPARIN SODIUM 5000 [USP'U]/ML
5000 INJECTION INTRAVENOUS; SUBCUTANEOUS EVERY 12 HOURS
Refills: 0 | Status: DISCONTINUED | OUTPATIENT
Start: 2022-02-07 | End: 2022-02-07

## 2022-02-07 RX ORDER — CIPROFLOXACIN LACTATE 400MG/40ML
1 VIAL (ML) INTRAVENOUS
Qty: 0 | Refills: 0 | DISCHARGE
Start: 2022-02-07

## 2022-02-07 RX ADMIN — Medication 500 MILLIGRAM(S): at 05:37

## 2022-02-07 RX ADMIN — Medication 500 MILLIGRAM(S): at 14:07

## 2022-02-07 RX ADMIN — HEPARIN SODIUM 5000 UNIT(S): 5000 INJECTION INTRAVENOUS; SUBCUTANEOUS at 05:37

## 2022-02-07 RX ADMIN — Medication 600 MILLIGRAM(S): at 11:28

## 2022-02-07 RX ADMIN — Medication 600 MILLIGRAM(S): at 11:58

## 2022-02-07 NOTE — DISCHARGE NOTE PROVIDER - NSDCFUADDINST_GEN_ALL_CORE_FT
Take Motrin and Tylenol as needed for pain. Continue Ciprofloxacin and Flagyl until 2/10. If worsening abdominal pain not controlled with oral pain meds, fevers, or purulent discharge please contact your provider or go to the emergency room.  Please follow up with Dr. Dove next week.

## 2022-02-07 NOTE — DISCHARGE NOTE NURSING/CASE MANAGEMENT/SOCIAL WORK - PATIENT PORTAL LINK FT
You can access the FollowMyHealth Patient Portal offered by [facility] by registering at the following website: [email or web address]. By joining [facility]’s FollowMyHealth portal, you will also be able to view your health information using other applications (apps) compatible with our system.

## 2022-02-07 NOTE — PROGRESS NOTE ADULT - SUBJECTIVE AND OBJECTIVE BOX
R1 GYN Progress Note     HD#3    Patient seen and examined at bedside. Yesterday IR drained 55cc of hematoma and left an 8.5 Lao catheter which was flushed this morning. No acute events overnight. No acute complaints.  Pain well controlled. Patient is ambulating and tolerating fluids. Patient is passing flatus. Denies CP, SOB, N/V, fevers, and chills.    Vital Signs Last 24 Hours  T(C): 36.3 (02-07-22 @ 02:38), Max: 36.9 (02-06-22 @ 18:00)  HR: 71 (02-07-22 @ 02:38) (71 - 90)  BP: 117/88 (02-07-22 @ 02:38) (113/77 - 134/85)  RR: 18 (02-07-22 @ 02:38) (17 - 19)  SpO2: 99% (02-07-22 @ 02:38) (98% - 100%)    I&O's Summary    06 Feb 2022 07:01  -  07 Feb 2022 06:39  --------------------------------------------------------  IN: 0 mL / OUT: 2355 mL / NET: -2355 mL        Physical Exam:  General: NAD  CV: RR, S1, S2  Lungs: CTA b/l  Abdomen: Soft, appropriately-tender to palpation over fundus, non-distended, normoactive bowel sounds  Incision: Catheter site CDI  Ext: No pain or swelling    Labs:                        10.3   11.83 )-----------( 477      ( 05 Feb 2022 22:48 )             31.6   baso 0.4    eos 1.5    imm gran 0.3    lymph 29.2   mono 6.3    poly 62.3       MEDICATIONS  (STANDING):  ciprofloxacin     Tablet 500 milliGRAM(s) Oral every 12 hours  heparin   Injectable 5000 Unit(s) SubCutaneous every 12 hours  metroNIDAZOLE    Tablet 500 milliGRAM(s) Oral every 8 hours    MEDICATIONS  (PRN):  acetaminophen     Tablet .. 975 milliGRAM(s) Oral every 6 hours PRN Mild Pain (1 - 3), Moderate Pain (4 - 6)  ibuprofen  Tablet. 600 milliGRAM(s) Oral every 6 hours PRN Moderate Pain (4 - 6)

## 2022-02-07 NOTE — PROGRESS NOTE ADULT - ASSESSMENT
A/P: 31y  s/p IR drainage of abdominal hematoma.  Patient is stable and doing well.      Neuro: Pain well controlled with Tylenol and Motrin  CV: Hemodynamically stable  Pulm: Saturating well on room air, encourage oob/amb  GI: Continue regular diet  : Voiding spontaneously   Heme: c/w heparin for DVT ppx  ID: Afebrile, downtrending leukocytosis. f/u IR culture, f/u AM CBC. Consider d/c cipro and flagyl.  Endo: No active issues     Dispo: Discharge planning    Marlin Kessler PGY1

## 2022-02-07 NOTE — DISCHARGE NOTE NURSING/CASE MANAGEMENT/SOCIAL WORK - NSDCPEFALRISK_GEN_ALL_CORE
For information on Fall & Injury Prevention, visit: [email or web address] OR  [email or web address] OR  https://www.cdc.gov/steadi/patient.html

## 2022-02-07 NOTE — DISCHARGE NOTE NURSING/CASE MANAGEMENT/SOCIAL WORK - NSDPDISTO_GEN_ALL_CORE
Home Pt DSD to previous IR MICHAELA drain site.  VS stable Afebrile. pt with positive bowel sounds reagan po diet./[facility]

## 2022-02-07 NOTE — DISCHARGE NOTE PROVIDER - CARE PROVIDER_API CALL
Chilango Dove)  Obstetrics and Gynecology  [address] [place] [address]  Phone: [phone]  Fax: [phone]  Follow Up Time: 1 week

## 2022-02-07 NOTE — DISCHARGE NOTE NURSING/CASE MANAGEMENT/SOCIAL WORK - NSDCPECAREGIVERED_GEN_ALL_CORE
Medline and carenotes for as well asHematoma, Cipro, Flagyl,  DC Medications and side effects literature for patient reference.

## 2022-02-07 NOTE — DISCHARGE NOTE PROVIDER - HOSPITAL COURSE
30yo  s/p rLTCS on 2021 with LMP  was admitted to the hospital with outpatient imaging demonstrating a hematoma.  Patient was seen by Dr. Dove on 2/3 for persistent abdominal pain, was started on ciprofloxacin and had CTAP that resulted Thursday revealing "4.8x4cm thick walled loculated fluid collection w/i hysterotomy defect & 3.7x3.1cm thick walled fluid collection in left paramedian abdominal wall" and patient was instructed to present to ED. On admission on  patient was afebrile, WBC was elevated to 11.8. Patient was continued on ciprofloxacin and started on flagyl. IR consulted for drainage of hematoma. Patient underwent IR drainage on  with drainage of 55cc of dark red old blood. MICHAELA drain left in site with minimal output. On postprocedure day 1 patient reported improvement in her pain, continued to be afebrile and was otherwise clinically stable. IR gram stain cultures preliminarily negative.  L MICHAELA drain removed on  and patient was discharged home with outpatient follow up. Abx to continue until 2/10.     Patient was instructed to follow up with Dr. Dove next week. 32yo  s/p rLTCS on 2021 with LMP  was admitted to the hospital with outpatient imaging demonstrating a hematoma.  Patient was seen by Dr. Dove on 2/3 for persistent abdominal pain, was started on ciprofloxacin and had CTAP that resulted Thursday revealing "4.8x4cm thick walled loculated fluid collection w/i hysterotomy defect & 3.7x3.1cm thick walled fluid collection in left paramedian abdominal wall" and patient was instructed to present to ED. On admission on  patient was afebrile, WBC was elevated to 11.8. Patient was continued on ciprofloxacin and started on flagyl. IR consulted for drainage of hematoma. Patient underwent IR drainage on  with drainage of 55cc of dark red old blood. MICHAELA drain left in site with minimal output. On postprocedure day 1 patient reported improvement in her pain, continued to be afebrile and was otherwise clinically stable. IR gram stain cultures preliminarily negative.  L MICHAELA drain removed on  and patient was discharged home with outpatient follow up. Abx to continue until 2/10.     Patient was instructed to follow up with Dr. Dove next week.                10.5   9.53  )-----------( 459      (  @ 06:47 )             32.8                10.3   11.83 )-----------( 477      (  @ 22:48 )             31.6

## 2022-02-07 NOTE — DISCHARGE NOTE NURSING/CASE MANAGEMENT/SOCIAL WORK - NSDCPNINST_GEN_ALL_CORE
Please follow up with Dr. Zamudio after discharge and call the office to make an appointment for 1 week after your discharge. Please ensure you complete the entire course of oral antibiotics through Thursday, February 10th. Please return to the ED if you experience pain that is unrelieved with tylenol or motrin, if you experience a fever greater than 100.4, chills, nausea/vomiting, or trouble urinating.

## 2022-02-07 NOTE — PROGRESS NOTE ADULT - SUBJECTIVE AND OBJECTIVE BOX
HPI:  31y , history of  2 months ago c/b pain, CT imaging showed a pelvic hematoma, mild leukocytosis, concern for infected hematoma.     POD # 1 drainage of anterior pelvic hematoma, overnight drainage of 5 cc of bloody fluid.     ROS: Pt denies fever, chills, pain    PE:   Neuro: A & O x3  Abdomen: Soft, nt, nd    Allergies:Benadryl (Anaphylaxis)  ceftriaxone (Hives (Mild to Mod))  Cipro (Diarrhea; Nausea)  erythromycin (Diarrhea; Nausea)  erythromycin (Unknown)  fish (Other)  penicillin (Unknown)    PAST MEDICAL & SURGICAL HISTORY:  Menstrual Disorder  Anemia B Twelve Deficiency  h/o Gall Bladder Disease  HELLP syndrome  COVID-19 vaccine series completed  Maternal care for low transverse scar from previous  delivery  Gestational diabetes  , diet controlled  Gestational hypertension  , Not on any meds.  h/o cholecystectomy   laproscopic silvia  History of   History of D&amp;C      Pertinent labs:                      10.5   9.53  )-----------( 459      ( 2022 06:47 )             32.8   02-05    138  |  102  |  8   ----------------------------<  93  4.0   |  25  |  0.60    Ca    9.0      2022 22:48  Phos  3.8     02-05  Mg     2.00     02-05    TPro  7.5  /  Alb  3.8  /  TBili  0.2  /  DBili  x   /  AST  10  /  ALT  11  /  AlkPhos  125<H>  02-05  PT/INR - ( 2022 22:48 )   PT: 15.3 sec;   INR: 1.35 ratio    PTT - ( 2022 22:48 )  PTT:31.2 sec    A/P:   -       HPI:  31y , history of  2 months ago c/b pain, CT imaging showed a pelvic hematoma, mild leukocytosis, concern for infected hematoma.     POD # 1 drainage of anterior pelvic hematoma, overnight drainage of 5 cc of bloody fluid.     ROS: Pt denies fever, chills, states has improved pain. Patient states she had some vaginal bleeding/ clotting this AM    PE:   Neuro: A & O x3  Abdomen: Soft, mild lower abdominal pain on deep palpation, nd. Outout of 5 cc of dark red blood in drainage bulb. Site c/d/i, no hematoma    Allergies:Benadryl (Anaphylaxis)  ceftriaxone (Hives (Mild to Mod))  Cipro (Diarrhea; Nausea)  erythromycin (Diarrhea; Nausea)  erythromycin (Unknown)  fish (Other)  penicillin (Unknown)    PAST MEDICAL & SURGICAL HISTORY:  Menstrual Disorder  Anemia B Twelve Deficiency  h/o Gall Bladder Disease  HELLP syndrome  COVID-19 vaccine series completed  Maternal care for low transverse scar from previous  delivery  Gestational diabetes  , diet controlled  Gestational hypertension  , Not on any meds.  h/o cholecystectomy   laproscopic silvia  History of   History of D&amp;C      Pertinent labs:                      10.5   9.53  )-----------( 459      ( 2022 06:47 )             32.8   02-05    138  |  102  |  8   ----------------------------<  93  4.0   |  25  |  0.60    Ca    9.0      2022 22:48  Phos  3.8     02-05  Mg     2.00     02-05    TPro  7.5  /  Alb  3.8  /  TBili  0.2  /  DBili  x   /  AST  10  /  ALT  11  /  AlkPhos  125<H>  02-05  PT/INR - ( 2022 22:48 )   PT: 15.3 sec;   INR: 1.35 ratio    PTT - ( 2022 22:48 )  PTT:31.2 sec    A/P:   -c/w antibiotics, WBC wnl now  -patient afebrile and hemodynamically stable  -record daily output from bulb, flush with 5 cc of sterile saline daily, keep site c/d/i  -f/u results of culture       HPI:  31y , history of  2 months ago c/b pain, CT imaging showed a pelvic hematoma, mild leukocytosis, concern for infected hematoma.     POD # 1 drainage of anterior pelvic hematoma, overnight drainage of 5 cc of bloody fluid.     ROS: Pt denies fever, chills, states has improved pain. Patient states she had some vaginal bleeding/ clotting this AM    PE:   Neuro: A & O x3  Abdomen: Soft, mild lower abdominal pain on deep palpation, nd. Outout of 5 cc of dark red blood in drainage bulb. Site c/d/i, no hematoma    Allergies:Benadryl (Anaphylaxis)  ceftriaxone (Hives (Mild to Mod))  Cipro (Diarrhea; Nausea)  erythromycin (Diarrhea; Nausea)  erythromycin (Unknown)  fish (Other)  penicillin (Unknown)    PAST MEDICAL & SURGICAL HISTORY:  Menstrual Disorder  Anemia B Twelve Deficiency  h/o Gall Bladder Disease  HELLP syndrome  COVID-19 vaccine series completed  Maternal care for low transverse scar from previous  delivery  Gestational diabetes  , diet controlled  Gestational hypertension  , Not on any meds.  h/o cholecystectomy   laproscopic silvia  History of   History of D&amp;C      Pertinent labs:                      10.5   9.53  )-----------( 459      ( 2022 06:47 )             32.8   02-05    138  |  102  |  8   ----------------------------<  93  4.0   |  25  |  0.60    Ca    9.0      2022 22:48  Phos  3.8     02-05  Mg     2.00     02-05    TPro  7.5  /  Alb  3.8  /  TBili  0.2  /  DBili  x   /  AST  10  /  ALT  11  /  AlkPhos  125<H>  02-05  PT/INR - ( 2022 22:48 )   PT: 15.3 sec;   INR: 1.35 ratio    PTT - ( 2022 22:48 )  PTT:31.2 sec    A/P:   -c/w antibiotics as outpatient x 7 days total course, WBC wnl now, gram stain negative, and culture pending--f/u culture as outpatient and adjust antibiotic therapy as needed  -patient afebrile and hemodynamically stable  -record daily output from bulb, removed at bedside today without issues. Sterile dry dressing applied.   -d/w Ob Dr. Patrick  -Patient cleared for discharge home today from IR standpoint       HPI:  31y , history of  2 months ago c/b pain, CT imaging showed a pelvic hematoma, mild leukocytosis, concern for infected hematoma.     POD # 1 drainage of anterior pelvic hematoma, overnight drainage of 5 cc of bloody fluid.     ROS: Pt denies fever, chills, states has improved pain. Patient states she had some vaginal bleeding/ clotting this AM    PE:   Neuro: A & O x3  Abdomen: Soft, mild lower abdominal pain on deep palpation, nd. Outout of 5 cc of dark red blood in drainage bulb. Site c/d/i, no hematoma    Allergies:Benadryl (Anaphylaxis)  ceftriaxone (Hives (Mild to Mod))  Cipro (Diarrhea; Nausea)  erythromycin (Diarrhea; Nausea)  erythromycin (Unknown)  fish (Other)  penicillin (Unknown)    PAST MEDICAL & SURGICAL HISTORY:  Menstrual Disorder  Anemia B Twelve Deficiency  h/o Gall Bladder Disease  HELLP syndrome  COVID-19 vaccine series completed  Maternal care for low transverse scar from previous  delivery  Gestational diabetes  , diet controlled  Gestational hypertension  , Not on any meds.  h/o cholecystectomy   laproscopic silvia  History of   History of D&amp;C      Pertinent labs:                      10.5   9.53  )-----------( 459      ( 2022 06:47 )             32.8   02-05    138  |  102  |  8   ----------------------------<  93  4.0   |  25  |  0.60    Ca    9.0      2022 22:48  Phos  3.8     02-05  Mg     2.00     02-05    TPro  7.5  /  Alb  3.8  /  TBili  0.2  /  DBili  x   /  AST  10  /  ALT  11  /  AlkPhos  125<H>  02-05  PT/INR - ( 2022 22:48 )   PT: 15.3 sec;   INR: 1.35 ratio    PTT - ( 2022 22:48 )  PTT:31.2 sec    A/P:   -c/w antibiotics as outpatient x 7 days total course, WBC wnl now, gram stain negative, and culture pending--f/u culture as outpatient and adjust antibiotic therapy as needed  -patient afebrile and hemodynamically stable  -Drainage removed at bedside today without issues. Sterile dry dressing applied.   -d/w Ob Dr. Patrick  -Patient cleared for discharge home today from IR standpoint

## 2022-02-07 NOTE — DISCHARGE NOTE PROVIDER - NSDCMRMEDTOKEN_GEN_ALL_CORE_FT
acetaminophen 325 mg oral tablet: 3 tab(s) orally every 6 hours, As needed, Mild Pain (1 - 3)  ciprofloxacin 500 mg oral tablet: 1 tab(s) orally every 12 hours  ibuprofen 600 mg oral tablet: 1 tab(s) orally every 6 hours, As needed, Moderate Pain (4 - 6)  metroNIDAZOLE 500 mg oral tablet: 1 tab(s) orally every 8 hours

## 2022-02-11 LAB
CULTURE RESULTS: SIGNIFICANT CHANGE UP
SPECIMEN SOURCE: SIGNIFICANT CHANGE UP

## 2022-02-14 ENCOUNTER — NON-APPOINTMENT (OUTPATIENT)
Age: 32
End: 2022-02-14

## 2022-02-14 ENCOUNTER — APPOINTMENT (OUTPATIENT)
Dept: CARDIOLOGY | Facility: CLINIC | Age: 32
End: 2022-02-14
Payer: COMMERCIAL

## 2022-02-14 VITALS
HEIGHT: 60 IN | OXYGEN SATURATION: 99 % | DIASTOLIC BLOOD PRESSURE: 84 MMHG | TEMPERATURE: 97.4 F | BODY MASS INDEX: 29.25 KG/M2 | SYSTOLIC BLOOD PRESSURE: 122 MMHG | HEART RATE: 96 BPM | WEIGHT: 149 LBS

## 2022-02-14 DIAGNOSIS — O14.10 SEVERE PRE-ECLAMPSIA, UNSPECIFIED TRIMESTER: ICD-10-CM

## 2022-02-14 PROCEDURE — 93000 ELECTROCARDIOGRAM COMPLETE: CPT

## 2022-02-14 PROCEDURE — 99203 OFFICE O/P NEW LOW 30 MIN: CPT

## 2022-02-14 NOTE — HISTORY OF PRESENT ILLNESS
[FreeTextEntry1] : 31 year old woman \par First pregnancy - 2016- PEC and HELLP\par Sent home on Procardia and Labetalol- on for a few weeks.\par \par Second pregnancy- delivered on  at 37.5- Gestational HTN/Gestational DM and then PEC\par Discahrged 2 days after delivery and then 2 days readmitted with PEC\par Given Mg and admitted for a few days and sent home on Procardia 30 mg -->increased to 60 mg daily\par Then regulated 2 weeks after and off meds\par \par BP at home is well controlled and even low at times

## 2022-02-14 NOTE — DISCUSSION/SUMMARY
[FreeTextEntry1] : 31 year old woman  who is here for evaluation of post partum PEC\par BP regulated on own after 2 weeks\par Now off meds\par EKG normal\par FU as needed

## 2022-02-16 ENCOUNTER — EMERGENCY (EMERGENCY)
Facility: HOSPITAL | Age: 32
LOS: 1 days | Discharge: HOME | End: 2022-02-16
Attending: EMERGENCY MEDICINE | Admitting: EMERGENCY MEDICINE
Payer: COMMERCIAL

## 2022-02-16 VITALS
RESPIRATION RATE: 17 BRPM | SYSTOLIC BLOOD PRESSURE: 106 MMHG | TEMPERATURE: 98 F | DIASTOLIC BLOOD PRESSURE: 76 MMHG | OXYGEN SATURATION: 100 % | HEART RATE: 87 BPM

## 2022-02-16 VITALS
HEIGHT: 60 IN | SYSTOLIC BLOOD PRESSURE: 120 MMHG | OXYGEN SATURATION: 99 % | RESPIRATION RATE: 17 BRPM | HEART RATE: 93 BPM | TEMPERATURE: 98 F | DIASTOLIC BLOOD PRESSURE: 85 MMHG

## 2022-02-16 DIAGNOSIS — Z98.891 HISTORY OF UTERINE SCAR FROM PREVIOUS SURGERY: Chronic | ICD-10-CM

## 2022-02-16 DIAGNOSIS — Z98.890 OTHER SPECIFIED POSTPROCEDURAL STATES: Chronic | ICD-10-CM

## 2022-02-16 LAB
ALBUMIN SERPL ELPH-MCNC: 4 G/DL — SIGNIFICANT CHANGE UP (ref 3.3–5)
ALP SERPL-CCNC: 119 U/L — SIGNIFICANT CHANGE UP (ref 40–120)
ALT FLD-CCNC: 8 U/L — SIGNIFICANT CHANGE UP (ref 4–33)
ANION GAP SERPL CALC-SCNC: 11 MMOL/L — SIGNIFICANT CHANGE UP (ref 7–14)
AST SERPL-CCNC: 11 U/L — SIGNIFICANT CHANGE UP (ref 4–32)
BILIRUB SERPL-MCNC: 0.2 MG/DL — SIGNIFICANT CHANGE UP (ref 0.2–1.2)
BLOOD GAS VENOUS COMPREHENSIVE RESULT: SIGNIFICANT CHANGE UP
BUN SERPL-MCNC: 8 MG/DL — SIGNIFICANT CHANGE UP (ref 7–23)
CALCIUM SERPL-MCNC: 9.3 MG/DL — SIGNIFICANT CHANGE UP (ref 8.4–10.5)
CHLORIDE SERPL-SCNC: 103 MMOL/L — SIGNIFICANT CHANGE UP (ref 98–107)
CO2 SERPL-SCNC: 24 MMOL/L — SIGNIFICANT CHANGE UP (ref 22–31)
CREAT SERPL-MCNC: 0.5 MG/DL — SIGNIFICANT CHANGE UP (ref 0.5–1.3)
GLUCOSE SERPL-MCNC: 100 MG/DL — HIGH (ref 70–99)
HCT VFR BLD CALC: 36.2 % — SIGNIFICANT CHANGE UP (ref 34.5–45)
HGB BLD-MCNC: 11.5 G/DL — SIGNIFICANT CHANGE UP (ref 11.5–15.5)
MCHC RBC-ENTMCNC: 28.5 PG — SIGNIFICANT CHANGE UP (ref 27–34)
MCHC RBC-ENTMCNC: 31.8 GM/DL — LOW (ref 32–36)
MCV RBC AUTO: 89.6 FL — SIGNIFICANT CHANGE UP (ref 80–100)
NRBC # BLD: 0 /100 WBCS — SIGNIFICANT CHANGE UP
NRBC # FLD: 0 K/UL — SIGNIFICANT CHANGE UP
PLATELET # BLD AUTO: 588 K/UL — HIGH (ref 150–400)
POTASSIUM SERPL-MCNC: 4.3 MMOL/L — SIGNIFICANT CHANGE UP (ref 3.5–5.3)
POTASSIUM SERPL-SCNC: 4.3 MMOL/L — SIGNIFICANT CHANGE UP (ref 3.5–5.3)
PROT SERPL-MCNC: 8 G/DL — SIGNIFICANT CHANGE UP (ref 6–8.3)
RBC # BLD: 4.04 M/UL — SIGNIFICANT CHANGE UP (ref 3.8–5.2)
RBC # FLD: 13 % — SIGNIFICANT CHANGE UP (ref 10.3–14.5)
SODIUM SERPL-SCNC: 138 MMOL/L — SIGNIFICANT CHANGE UP (ref 135–145)
WBC # BLD: 12.36 K/UL — HIGH (ref 3.8–10.5)
WBC # FLD AUTO: 12.36 K/UL — HIGH (ref 3.8–10.5)

## 2022-02-16 PROCEDURE — 74177 CT ABD & PELVIS W/CONTRAST: CPT | Mod: 26,MA

## 2022-02-16 PROCEDURE — 99285 EMERGENCY DEPT VISIT HI MDM: CPT

## 2022-02-16 RX ORDER — ACETAMINOPHEN 500 MG
975 TABLET ORAL ONCE
Refills: 0 | Status: COMPLETED | OUTPATIENT
Start: 2022-02-16 | End: 2022-02-16

## 2022-02-16 RX ADMIN — Medication 300 MILLIGRAM(S): at 10:52

## 2022-02-16 RX ADMIN — Medication 975 MILLIGRAM(S): at 10:52

## 2022-02-16 NOTE — ED PROVIDER NOTE - OBJECTIVE STATEMENT
Lance:  ([facility]) in Dec. Since then, abd hematoma. Recent dc from [facility] for IR drainage; no growth, MICHAELA drain pulled, finished Cipro/Flagyl. Improved pain and swelling. Then, 3 days ago, notice return of swelling, pain, redness. No F.

## 2022-02-16 NOTE — ED PROVIDER NOTE - NSFOLLOWUPINSTRUCTIONS_ED_ALL_ED_FT
Phlegmon    An phlegmon is an infected area that contains a collection of pus and debris. It can occur in almost any part of the body and occurs when the tissue gets infection. Symptoms include a painful mass that is red, warm, tender that might break open and HAVE drainage. Take antibiotics as directed.  Follow up in gynecology clinic as discussed.     SEEK IMMEDIATE MEDICAL CARE IF YOU HAVE ANY OF THE FOLLOWING SYMPTOMS: chills, fever, muscle aches, or red streaking from the area.

## 2022-02-16 NOTE — ED PROVIDER NOTE - CLINICAL SUMMARY MEDICAL DECISION MAKING FREE TEXT BOX
Lance: Concern for recurrent hematoma vs. cellulitis vs. infected residual hematoma pocket. Check labs, CT. Give Clinda. Notify Gyn.

## 2022-02-16 NOTE — CONSULT NOTE ADULT - ASSESSMENT
32yo  s/p rLTCS on 2021 c/b postoperative hematoma s/p IR drainage on  d/c home on 1 week course of CTX and flagyl p/w worsening abdominal pain and area of induration and erythema. Patient is afebrile with stable VSS, worsening leukocytosis of 12 and imaging findings concerning for small developing abscess  - recommend IR consultation    D/w Dr. Derrell Reina, PGY-2 32yo  s/p rLTCS on 2021 c/b postoperative hematoma s/p IR drainage on  d/c home on 1 week course of CTX and flagyl p/w worsening abdominal pain and area of induration and erythema. Patient is afebrile with stable VSS, worsening leukocytosis of 12 and imaging findings concerning for small developing abscess/cellulitis.   - recommend IR consultation    D/w Dr. Derrell Reina, PGY-2    Addendum: Discussed the case with IR, fluid pocket too small to drain at this time. Patient is hemodynamically stable and afebrile, no indications for admission or urgent GYN interventions at this time.  - Recommend conservative management with doxycycline 100mg BID for 7 days  - Outpatient f/u with Dr. Dove within 1 week  - Return precautions given    D/w Dr. Derrell Reina, PGY-2

## 2022-02-16 NOTE — ED PROVIDER NOTE - PHYSICAL EXAMINATION
Well appearing, well nourished, awake, alert, oriented to person, place, time/situation and in no apparent distress.    Airway patent    Eyes without scleral injection. No jaundice.    Strong pulse.    Respirations unlabored.    Abdomen soft, no guarding. Erythema, firmness, tenderness at center of lower abd area where had intra-abd hematoma before.    Spine appears normal, range of motion is not limited, no muscle or joint tenderness.    Alert and oriented, no gross motor or sensory deficits.    Skin normal color for race, warm, dry and intact. No evidence of rash.    No SI/HI.

## 2022-02-16 NOTE — ED PROVIDER NOTE - PROGRESS NOTE DETAILS
PHIL:  Patient signed out to me by Dr. Lucas pending GYN recs for abdominal phlegmon and cellulitis.  GYN spoke with IR who do not recommend procedural intervention at this time.  I discussed case with gyn resident who recommends discharge without patient abx.  I spoke with patient who agrees with plan, she is HD stable and non-toxic appearing,  She is not breastfeeding.  Will dc with doxy per gyn recs.  Patient to follow in office.

## 2022-02-16 NOTE — CONSULT NOTE ADULT - SUBJECTIVE AND OBJECTIVE BOX
TAWNY GILMORE  31y  Female [id number]    HPI:  30yo  s/p rLTCS on 2021 c/b postoperative hematoma s/p IR drainage on  d/c home on 1 week course of CTX and flagyl p/w worsening abdominal pain, increased harndness and erythema cephalad to the incision.     Patient was seen by Dr. Dove on 2/3 for persistent abdominal pain, was started on ciprofloxacin and had CTAP that resulted Thursday revealing "4.8x4cm thick walled loculated fluid collection w/i hysterotomy defect & 3.7x3.1cm thick walled fluid collection in left paramedian abdominal wall" and patient was instructed to present to ED. On admission on  patient was afebrile, WBC was elevated to 11.8. Patient was continued on ciprofloxacin and started on flagyl. IR consulted for drainage of hematoma. Patient underwent IR drainage on  with drainage of 55cc of dark red old blood. MICHAELA drain left in site with minimal output. On postprocedure day 1 patient reported improvement in her pain, continued to be afebrile and was otherwise clinically stable. IR gram stain cultures preliminarily negative.  L MICHAELA drain removed on  and patient was discharged home with outpatient follow up. Abx to continue until 2/10.         Name of GYN Physician: Derrell  OBHx: - FT emergency pLTCS [facility] 5#13 c/b pp SPEC s/p Mg & antihypertensives, - MAB s/p D&C, 2021- rLTCS c/b pp sPEC s/p Mg & procardia 60   PMH: denies  PSH: C/Sx2, D&Cx2  Meds: none  Allx: benadryl- anaphylaxis, ceftriaxone- hives  Social History:  Denies smoking use, drug use, alcohol use.   +occasional social alcohol useHospital Meds:   MEDICATIONS  (STANDING):    MEDICATIONS  (PRN):      Allergies    Benadryl (Anaphylaxis)  ceftriaxone (Hives (Mild to Mod))  Cipro (Diarrhea; Nausea)  erythromycin (Diarrhea; Nausea)  erythromycin (Unknown)  fish (Other)    Intolerances    penicillin (Unknown)      PAST MEDICAL & SURGICAL HISTORY:  Menstrual Disorder    Anemia B Twelve Deficiency    h/o Gall Bladder Disease    HELLP syndrome  2016    COVID-19 vaccine series completed    Maternal care for low transverse scar from previous  delivery    Gestational diabetes  , diet controlled    Gestational hypertension  , Not on any meds.    h/o cholecystectomy   laproscopic silvia    History of   2016    History of D&amp;C          FAMILY HISTORY:        Vital Signs Last 24 Hrs  T(C): 36.6 (2022 13:27), Max: 36.6 (2022 09:46)  T(F): 97.9 (2022 13:27), Max: 97.9 (2022 09:46)  HR: 89 (2022 13:27) (89 - 93)  BP: 105/67 (2022 13:27) (105/67 - 120/85)  BP(mean): --  RR: 16 (2022 13:27) (16 - 17)  SpO2: 99% (2022 13:27) (99% - 99%)    Physical Exam:   General: sitting comftorably in bed, NAD   HEENT: neck supple, full ROM  CV: RR S1S2 no m/r/g  Lungs: CTA b/l, good air flow b/l   Back: No CVA tenderness  Abd: Soft, non-tender, non-distended.  Bowel sounds present.    :  No bleeding on pad.    External labia wnl.  Bimanual exam with no cervical motion tenderness, uterus wnl, adnexa non palpable b/l.  Cervix closed vs. Cervix dilated    cm   Speculum Exam: No active bleeding from os.  Physiologic discharge.    Ext: non-tender b/l, no edema     LABS:                              11.5   12.36 )-----------( 588      ( 2022 10:53 )             36.2     02-    138  |  103  |  8   ----------------------------<  100<H>  4.3   |  24  |  0.50    Ca    9.3      2022 10:53    TPro  8.0  /  Alb  4.0  /  TBili  0.2  /  DBili  x   /  AST  11  /  ALT  8   /  AlkPhos  119  02-16    I&O's Detail          RADIOLOGY & ADDITIONAL STUDIES:         TAWNY GILMORE  31y  Female [id number]    HPI:  30yo  s/p rLTCS on 2021 c/b postoperative hematoma s/p IR drainage on  d/c home on 1 week course of CTX and flagyl p/w worsening abdominal pain, area that is more dense to palpation and erythema cephalad to the incision. Patient states that she was doing well since discharge. She had some nausea while on abx but it improved since discontinuing the abx on 2/10. 2 days ago she developed pain above her incision site and the noted worsening superficial erythema. Also reports that it was warm to touch.     Prior illness course briefly : seen by Dr. Dove on 2/3 for persistent abdominal pain, was started on ciprofloxacin and had CTAP showed "4.8x4cm thick walled loculated fluid collection w/i hysterotomy defect & 3.7x3.1cm thick walled fluid collection in left paramedian abdominal wall" and patient was sent to the ED. On admission on  patient was afebrile, WBC was elevated to 11.8. Patient was continued on ciprofloxacin and started on flagyl.  Patient underwent IR drainage on  with drainage of 55cc of dark red old blood. MICHAELA drain left in site with minimal output. On postprocedure day 1 patient reported improvement in her pain, continued to be afebrile and was otherwise clinically stable. IR gram stain cultures preliminarily negative.  L MICHAELA drain removed on  and patient was discharged home with outpatient follow up. Abx to continue until 2/10.     Name of GYN Physician: Derrell  OBHx: 2016- FT emergency pLTCS [facility] 5#13 c/b pp SPEC s/p Mg & antihypertensives, - MAB s/p D&C, 2021- rLTCS c/b pp sPEC s/p Mg & procardia 60   PMH: denies  PSH: C/Sx2, D&Cx2  Meds: none  Allx: benadryl- anaphylaxis, ceftriaxone- hives  Social History:  Denies smoking use, drug use, alcohol use.   +occasional social alcohol use  Hospital Meds:   MEDICATIONS  (STANDING):    MEDICATIONS  (PRN):      Allergies    Benadryl (Anaphylaxis)  ceftriaxone (Hives (Mild to Mod))  Cipro (Diarrhea; Nausea)  erythromycin (Diarrhea; Nausea)  erythromycin (Unknown)  fish (Other)    Intolerances    penicillin (Unknown)      PAST MEDICAL & SURGICAL HISTORY:  Menstrual Disorder    Anemia B Twelve Deficiency    h/o Gall Bladder Disease    HELLP syndrome  2016    COVID-19 vaccine series completed    Maternal care for low transverse scar from previous  delivery    Gestational diabetes  , diet controlled    Gestational hypertension  , Not on any meds.    h/o cholecystectomy   laproscopic silvia    History of   2016    History of D&amp;C          FAMILY HISTORY:        Vital Signs Last 24 Hrs  T(C): 36.6 (2022 13:27), Max: 36.6 (2022 09:46)  T(F): 97.9 (2022 13:27), Max: 97.9 (2022 09:46)  HR: 89 (2022 13:27) (89 - 93)  BP: 105/67 (2022 13:27) (105/67 - 120/85)  BP(mean): --  RR: 16 (2022 13:27) (16 - 17)  SpO2: 99% (2022 13:27) (99% - 99%)    Physical Exam:   General: sitting comfortably in bed, NAD   CV: RRR  Lungs: CTA b/l  Abd: Soft, 2 cm above the incision there is an area of induration with overlying erythema, warm to touch. Also tender to palpation in RLQ  Ext: non-tender b/l, no edema     LABS:                       11.5   12.36 )-----------( 588      ( 2022 10:53 )             36.2         138  |  103  |  8   ----------------------------<  100<H>  4.3   |  24  |  0.50    Ca    9.3      2022 10:53    TPro  8.0  /  Alb  4.0  /  TBili  0.2  /  DBili  x   /  AST  11  /  ALT  8   /  AlkPhos  119      I&O's Detail          RADIOLOGY & ADDITIONAL STUDIES:  < from: CT Abdomen and Pelvis w/ IV Cont (22 @ 14:16) >  FINDINGS:  LOWER CHEST: Within normal limits.    LIVER: The liver is mildly enlarged, measures 18.8 cm craniocaudally.   Redemonstrated 2.0 cm hemangioma within the left lateral lobe.  BILE DUCTS: Normal caliber.  GALLBLADDER: Cholecystectomy.  SPLEEN: Within normal limits.  PANCREAS: Within normal limits.  ADRENALS: Within normal limits.  KIDNEYS/URETERS: Within normal limits.    BLADDER: Within normal limits.  REPRODUCTIVE ORGANS: Post surgical changes of  section. Again   seen is a collection located along the hysterotomy defect, measures 2.5 x   2.4 x 1.6 cm (series 2 image89), previously 2.5 x 2.3 x 1.8 cm when   measured in similar fashion.    BOWEL: No bowel obstruction.  PERITONEUM: No ascites.  VESSELS: Within normal limits.  RETROPERITONEUM/LYMPH NODES: Small left para-aortic lymph node measuring   1.0 x 0.9 cm, potentially reactive.  ABDOMINAL WALL: Along the lower anterior abdominal wall incision site,   there is a 3.9 x 4.3 x 3.1 cm site of soft tissue infiltration, which   could reflect phlegmonous and/or postoperative changes. There is   overlying skin thickening. The previously identified collection along the   superior to mid aspect of this infiltration is no longer clearly   identified status post drainage. However, there is a new site of   ill-defined low attenuation along the inferior aspect measuring 1.4 x 3.0   x 1.1 cm, which could reflect developing abscess.   Infiltration/inflammatory changes extend towards and is contiguous with   the hysterotomy site.  BONES: Within normal limits.    IMPRESSION:  No significant change in a collection associated with the    section hysterotomy defect.    Inflammatory changes in the anterior abdominal wall along the    section incision site, which could reflect a combination of phlegmonous   and postsurgical change. Ill-defined area of low density along the   inferior margin measuring 1.4 x 3.0 x 1.1 cm could reflect developing   abscess. Overlying skin thickening may reflect cellulitis.          < end of copied text >         TAWNY GILMORE  31y  Female [id number]    HPI:  32yo  s/p rLTCS on 2021 c/b postoperative hematoma s/p IR drainage on  d/c home on 1 week course of CTX and flagyl p/w worsening abdominal pain, area that is more dense to palpation and erythema cephalad to the incision. IR cultures negative. Patient states that she was initially doing well since discharge. She had some nausea while on abx but it improved since discontinuing the abx on 2/10. 2 days ago she developed pain above her incision site and the noted worsening superficial erythema. Also reports that it was warm to touch. Denies any F/C, CP, SOB, vomiting, bowel habit changes, vaginal bleeding, or dysuria.     Prior illness course briefly : seen by Dr. Dove on 2/3 for persistent abdominal pain, was started on ciprofloxacin and had CTAP showed "4.8x4cm thick walled loculated fluid collection w/i hysterotomy defect & 3.7x3.1cm thick walled fluid collection in left paramedian abdominal wall" and patient was sent to the ED. On admission on  patient was afebrile, WBC was elevated to 11.8. Patient was continued on ciprofloxacin and started on flagyl.  Patient underwent IR drainage on  with drainage of 55cc of dark red old blood. MICHAELA drain left in site with minimal output. On postprocedure day 1 patient reported improvement in her pain, continued to be afebrile and was otherwise clinically stable. IR gram stain cultures preliminarily negative.  L MICHAELA drain removed on  and patient was discharged home with outpatient follow up. Abx to continue until 2/10.     Name of GYN Physician: Derrell  OBHx: - FT emergency pLTCS [facility] 5#13 c/b pp SPEC s/p Mg & antihypertensives, - MAB s/p D&C, 2021- rLTCS c/b pp sPEC s/p Mg & procardia 60   PMH: denies  PSH: C/Sx2, D&Cx2  Meds: none  Allx: benadryl- anaphylaxis, ceftriaxone- hives  Social History:  Denies smoking use, drug use, alcohol use.   +occasional social alcohol use  Hospital Meds:   MEDICATIONS  (STANDING):    MEDICATIONS  (PRN):      Allergies    Benadryl (Anaphylaxis)  ceftriaxone (Hives (Mild to Mod))  Cipro (Diarrhea; Nausea)  erythromycin (Diarrhea; Nausea)  erythromycin (Unknown)  fish (Other)    Intolerances    penicillin (Unknown)      PAST MEDICAL & SURGICAL HISTORY:  Menstrual Disorder    Anemia B Twelve Deficiency    h/o Gall Bladder Disease    HELLP syndrome  2016    COVID-19 vaccine series completed    Maternal care for low transverse scar from previous  delivery    Gestational diabetes  , diet controlled    Gestational hypertension  , Not on any meds.    h/o cholecystectomy   laproscopic silvia    History of   2016    History of D&amp;C          FAMILY HISTORY:        Vital Signs Last 24 Hrs  T(C): 36.6 (2022 13:27), Max: 36.6 (2022 09:46)  T(F): 97.9 (2022 13:27), Max: 97.9 (2022 09:46)  HR: 89 (2022 13:27) (89 - 93)  BP: 105/67 (2022 13:27) (105/67 - 120/85)  BP(mean): --  RR: 16 (2022 13:27) (16 - 17)  SpO2: 99% (2022 13:27) (99% - 99%)    Physical Exam:   General: sitting comfortably in bed, NAD   CV: RRR  Lungs: CTA b/l  Abd: Soft, 2 cm above the incision there is an area of induration with overlying erythema, warm to touch. Also tender to palpation in RLQ  Ext: non-tender b/l, no edema     LABS:                       11.5   12.36 )-----------( 588      ( 2022 10:53 )             36.2     02-    138  |  103  |  8   ----------------------------<  100<H>  4.3   |  24  |  0.50    Ca    9.3      2022 10:53    TPro  8.0  /  Alb  4.0  /  TBili  0.2  /  DBili  x   /  AST  11  /  ALT  8   /  AlkPhos  119  -    I&O's Detail          RADIOLOGY & ADDITIONAL STUDIES:  < from: CT Abdomen and Pelvis w/ IV Cont (22 @ 14:16) >  FINDINGS:  LOWER CHEST: Within normal limits.    LIVER: The liver is mildly enlarged, measures 18.8 cm craniocaudally.   Redemonstrated 2.0 cm hemangioma within the left lateral lobe.  BILE DUCTS: Normal caliber.  GALLBLADDER: Cholecystectomy.  SPLEEN: Within normal limits.  PANCREAS: Within normal limits.  ADRENALS: Within normal limits.  KIDNEYS/URETERS: Within normal limits.    BLADDER: Within normal limits.  REPRODUCTIVE ORGANS: Post surgical changes of  section. Again   seen is a collection located along the hysterotomy defect, measures 2.5 x   2.4 x 1.6 cm (series 2 image89), previously 2.5 x 2.3 x 1.8 cm when   measured in similar fashion.    BOWEL: No bowel obstruction.  PERITONEUM: No ascites.  VESSELS: Within normal limits.  RETROPERITONEUM/LYMPH NODES: Small left para-aortic lymph node measuring   1.0 x 0.9 cm, potentially reactive.  ABDOMINAL WALL: Along the lower anterior abdominal wall incision site,   there is a 3.9 x 4.3 x 3.1 cm site of soft tissue infiltration, which   could reflect phlegmonous and/or postoperative changes. There is   overlying skin thickening. The previously identified collection along the   superior to mid aspect of this infiltration is no longer clearly   identified status post drainage. However, there is a new site of   ill-defined low attenuation along the inferior aspect measuring 1.4 x 3.0   x 1.1 cm, which could reflect developing abscess.   Infiltration/inflammatory changes extend towards and is contiguous with   the hysterotomy site.  BONES: Within normal limits.    IMPRESSION:  No significant change in a collection associated with the    section hysterotomy defect.    Inflammatory changes in the anterior abdominal wall along the    section incision site, which could reflect a combination of phlegmonous   and postsurgical change. Ill-defined area of low density along the   inferior margin measuring 1.4 x 3.0 x 1.1 cm could reflect developing   abscess. Overlying skin thickening may reflect cellulitis.          < end of copied text >         TAWNY GILMORE  31y  Female [id number]    HPI:  32yo  s/p rLTCS on 2021 c/b postoperative hematoma s/p IR drainage on  d/c home on 1 week course of CTX and flagyl p/w worsening abdominal pain, area that is more dense to palpation and erythema cephalad to the incision. Patient states that she was initially doing well since discharge. She had some nausea while on abx but it improved since discontinuing the abx on 2/10. 2 days ago she developed pain above her incision site and the noted worsening superficial erythema. Also reports that it was warm to touch. Denies any F/C, CP, SOB, vomiting, bowel habit changes, vaginal bleeding, or dysuria. Of note, patient is not breastfeeding.    Prior illness course briefly : rLTCS on 21. Seen for f/u by Dr. Dove on 2/3 for persistent abdominal pain, was started on ciprofloxacin and had CTAP showed "4.8x4cm thick walled loculated fluid collection w/i hysterotomy defect & 3.7x3.1cm thick walled fluid collection in left paramedian abdominal wall" and patient for IR drainage. She underwent IR drainage on  with drainage of 55cc of dark red old blood with placement of L MICHAELA drain for 1 day. Patient discharged home on POD#1 on 1 week course of CTX and Flagyl due to be d/c on 2/10. IR cultures negative.     Name of GYN Physician: Derrell  [facility]: [facility] s/p Mg & antihypertensives, - MAB s/p D&C, 2021- rLTCS c/b pp sPEC s/p Mg & procardia 60   PMH: denies  PSH: C/Sx2, D&Cx2  Meds: none  Allx: benadryl- anaphylaxis, ceftriaxone- hives  Social History:  Denies smoking use, drug use, alcohol use.   +occasional social alcohol use  Hospital Meds:   MEDICATIONS  (STANDING):    MEDICATIONS  (PRN):      Allergies    Benadryl (Anaphylaxis)  ceftriaxone (Hives (Mild to Mod))  Cipro (Diarrhea; Nausea)  erythromycin (Diarrhea; Nausea)  erythromycin (Unknown)  fish (Other)    Intolerances    penicillin (Unknown)      PAST MEDICAL & SURGICAL HISTORY:  Menstrual Disorder    Anemia B Twelve Deficiency    h/o Gall Bladder Disease    HELLP syndrome  2016    COVID-19 vaccine series completed    Maternal care for low transverse scar from previous  delivery    Gestational diabetes  , diet controlled    Gestational hypertension  , Not on any meds.    h/o cholecystectomy   laproscopic silvia    History of   2016    History of D&amp;C  2009        FAMILY HISTORY:        Vital Signs Last 24 Hrs  T(C): 36.6 (2022 13:27), Max: 36.6 (2022 09:46)  T(F): 97.9 (2022 13:27), Max: 97.9 (2022 09:46)  HR: 89 (2022 13:27) (89 - 93)  BP: 105/67 (2022 13:27) (105/67 - 120/85)  BP(mean): --  RR: 16 (2022 13:27) (16 - 17)  SpO2: 99% (2022 13:27) (99% - 99%)    Physical Exam:   General: sitting comfortably in bed, NAD   CV: RRR  Lungs: CTA b/l  Abd: Soft, 2 cm above the incision there is an area of induration with overlying erythema, warm to touch. Also tender to palpation in RLQ  Ext: non-tender b/l, no edema     LABS:                       11.5   12.36 )-----------( 588      ( 2022 10:53 )             36.2         138  |  103  |  8   ----------------------------<  100<H>  4.3   |  24  |  0.50    Ca    9.3      2022 10:53    TPro  8.0  /  Alb  4.0  /  TBili  0.2  /  DBili  x   /  AST  11  /  ALT  8   /  AlkPhos  119      I&O's Detail          RADIOLOGY & ADDITIONAL STUDIES:  < from: CT Abdomen and Pelvis w/ IV Cont (22 @ 14:16) >  FINDINGS:  LOWER CHEST: Within normal limits.    LIVER: The liver is mildly enlarged, measures 18.8 cm craniocaudally.   Redemonstrated 2.0 cm hemangioma within the left lateral lobe.  BILE DUCTS: Normal caliber.  GALLBLADDER: Cholecystectomy.  SPLEEN: Within normal limits.  PANCREAS: Within normal limits.  ADRENALS: Within normal limits.  KIDNEYS/URETERS: Within normal limits.    BLADDER: Within normal limits.  REPRODUCTIVE ORGANS: Post surgical changes of  section. Again   seen is a collection located along the hysterotomy defect, measures 2.5 x   2.4 x 1.6 cm (series 2 image89), previously 2.5 x 2.3 x 1.8 cm when   measured in similar fashion.    BOWEL: No bowel obstruction.  PERITONEUM: No ascites.  VESSELS: Within normal limits.  RETROPERITONEUM/LYMPH NODES: Small left para-aortic lymph node measuring   1.0 x 0.9 cm, potentially reactive.  ABDOMINAL WALL: Along the lower anterior abdominal wall incision site,   there is a 3.9 x 4.3 x 3.1 cm site of soft tissue infiltration, which   could reflect phlegmonous and/or postoperative changes. There is   overlying skin thickening. The previously identified collection along the   superior to mid aspect of this infiltration is no longer clearly   identified status post drainage. However, there is a new site of   ill-defined low attenuation along the inferior aspect measuring 1.4 x 3.0   x 1.1 cm, which could reflect developing abscess.   Infiltration/inflammatory changes extend towards and is contiguous with   the hysterotomy site.  BONES: Within normal limits.    IMPRESSION:  No significant change in a collection associated with the    section hysterotomy defect.    Inflammatory changes in the anterior abdominal wall along the    section incision site, which could reflect a combination of phlegmonous   and postsurgical change. Ill-defined area of low density along the   inferior margin measuring 1.4 x 3.0 x 1.1 cm could reflect developing   abscess. Overlying skin thickening may reflect cellulitis.          < end of copied text >

## 2022-02-16 NOTE — CONSULT NOTE ADULT - ASSESSMENT
Interventional Radiology    Evaluate for Procedure: Drainage of anterior abdominal wall collection    HPI: 30yo  s/p rLTCS on 2021 c/b postoperative hematoma s/p IR drainage on  d/c home on 1 week course of CTX and flagyl p/w worsening abdominal pain, area that is more dense to palpation and erythema cephalad to the incision. IR cultures negative. Patient states that she was initially doing well since discharge. She had some nausea while on abx but it improved since discontinuing the abx on 2/10. 2 days ago she developed pain above her incision site and the noted worsening superficial erythema. Also reports that it was warm to touch.     Allergies: Benadryl (Anaphylaxis)  ceftriaxone (Hives (Mild to Mod))  Cipro (Diarrhea; Nausea)  erythromycin (Diarrhea; Nausea)  erythromycin (Unknown)    Medications (Abx/Cardiac/Anticoagulation/Blood Products)    clindamycin   Capsule: 300 milliGRAM(s) Oral ( @ 10:52)    Data:  152.4  T(C): 36.5  HR: 87  BP: 106/76  RR: 17  SpO2: 100%    -WBC 12.36 / HgB 11.5 / Hct 36.2 / Plt 588  -Na 138 / Cl 103 / BUN 8 / Glucose 100  -K 4.3 / CO2 24 / Cr 0.50  -ALT 8 / Alk Phos 119 / T.Bili 0.2  -INR 1.35 / PTT 31.2    Radiology:   < from: CT Abdomen and Pelvis w/ IV Cont (22 @ 14:16) >  IMPRESSION:  No significant change in a collection associated with the    section hysterotomy defect.    Inflammatory changes in the anterior abdominal wall along the    section incision site, which could reflect a combination of phlegmonous   and postsurgical change. Ill-defined area of low density along the   inferior margin measuring 1.4 x 3.0 x 1.1 cm could reflect developing   abscess. Overlying skin thickening may reflect cellulitis.        --- End of Report ---    < end of copied text >    Assessment/Plan:   -31y Female s/p C section  c/b hematoma s/p drainage  presenting with abdominal pain found to have new fluid collection.    - At this time, fluid collection is too small to drain.  - Recommend conservative management with antibiotics.  - If clinically warranted, may repeat imaging in a week and follow up with IR.

## 2022-02-16 NOTE — ED ADULT TRIAGE NOTE - CHIEF COMPLAINT QUOTE
Pt had hematoma drained last Sunday, noticed "redness and swelling to incision site on Monday, worsening yesterday". Denies fever, chills.

## 2022-02-18 ENCOUNTER — TRANSCRIPTION ENCOUNTER (OUTPATIENT)
Age: 32
End: 2022-02-18

## 2022-02-21 LAB
CULTURE RESULTS: SIGNIFICANT CHANGE UP
CULTURE RESULTS: SIGNIFICANT CHANGE UP
SPECIMEN SOURCE: SIGNIFICANT CHANGE UP
SPECIMEN SOURCE: SIGNIFICANT CHANGE UP

## 2022-02-25 ENCOUNTER — EMERGENCY (EMERGENCY)
Facility: HOSPITAL | Age: 32
LOS: 1 days | Discharge: HOME | End: 2022-02-25
Attending: STUDENT IN AN ORGANIZED HEALTH CARE EDUCATION/TRAINING PROGRAM | Admitting: STUDENT IN AN ORGANIZED HEALTH CARE EDUCATION/TRAINING PROGRAM
Payer: COMMERCIAL

## 2022-02-25 VITALS
HEART RATE: 86 BPM | RESPIRATION RATE: 16 BRPM | DIASTOLIC BLOOD PRESSURE: 87 MMHG | OXYGEN SATURATION: 100 % | SYSTOLIC BLOOD PRESSURE: 129 MMHG | TEMPERATURE: 98 F

## 2022-02-25 VITALS
RESPIRATION RATE: 16 BRPM | DIASTOLIC BLOOD PRESSURE: 88 MMHG | TEMPERATURE: 98 F | HEART RATE: 90 BPM | OXYGEN SATURATION: 99 % | SYSTOLIC BLOOD PRESSURE: 132 MMHG | HEIGHT: 60 IN

## 2022-02-25 DIAGNOSIS — Z98.891 HISTORY OF UTERINE SCAR FROM PREVIOUS SURGERY: Chronic | ICD-10-CM

## 2022-02-25 DIAGNOSIS — Z98.890 OTHER SPECIFIED POSTPROCEDURAL STATES: Chronic | ICD-10-CM

## 2022-02-25 LAB
ALBUMIN SERPL ELPH-MCNC: 4.2 G/DL — SIGNIFICANT CHANGE UP (ref 3.3–5)
ALP SERPL-CCNC: 127 U/L — HIGH (ref 40–120)
ALT FLD-CCNC: 10 U/L — SIGNIFICANT CHANGE UP (ref 4–33)
ANION GAP SERPL CALC-SCNC: 10 MMOL/L — SIGNIFICANT CHANGE UP (ref 7–14)
AST SERPL-CCNC: 11 U/L — SIGNIFICANT CHANGE UP (ref 4–32)
BASE EXCESS BLDV CALC-SCNC: 3.4 MMOL/L — HIGH (ref -2–3)
BASOPHILS # BLD AUTO: 0.07 K/UL — SIGNIFICANT CHANGE UP (ref 0–0.2)
BASOPHILS NFR BLD AUTO: 0.7 % — SIGNIFICANT CHANGE UP (ref 0–2)
BILIRUB SERPL-MCNC: 0.2 MG/DL — SIGNIFICANT CHANGE UP (ref 0.2–1.2)
BLOOD GAS VENOUS COMPREHENSIVE RESULT: SIGNIFICANT CHANGE UP
BUN SERPL-MCNC: 8 MG/DL — SIGNIFICANT CHANGE UP (ref 7–23)
CALCIUM SERPL-MCNC: 9.1 MG/DL — SIGNIFICANT CHANGE UP (ref 8.4–10.5)
CHLORIDE BLDV-SCNC: 103 MMOL/L — SIGNIFICANT CHANGE UP (ref 96–108)
CHLORIDE SERPL-SCNC: 102 MMOL/L — SIGNIFICANT CHANGE UP (ref 98–107)
CO2 BLDV-SCNC: 31.5 MMOL/L — HIGH (ref 22–26)
CO2 SERPL-SCNC: 27 MMOL/L — SIGNIFICANT CHANGE UP (ref 22–31)
CREAT SERPL-MCNC: 0.58 MG/DL — SIGNIFICANT CHANGE UP (ref 0.5–1.3)
EOSINOPHIL # BLD AUTO: 0.16 K/UL — SIGNIFICANT CHANGE UP (ref 0–0.5)
EOSINOPHIL NFR BLD AUTO: 1.6 % — SIGNIFICANT CHANGE UP (ref 0–6)
GAS PNL BLDV: 137 MMOL/L — SIGNIFICANT CHANGE UP (ref 136–145)
GLUCOSE BLDV-MCNC: 93 MG/DL — SIGNIFICANT CHANGE UP (ref 70–99)
GLUCOSE SERPL-MCNC: 93 MG/DL — SIGNIFICANT CHANGE UP (ref 70–99)
HCO3 BLDV-SCNC: 30 MMOL/L — HIGH (ref 22–29)
HCT VFR BLD CALC: 34.5 % — SIGNIFICANT CHANGE UP (ref 34.5–45)
HCT VFR BLDA CALC: 35 % — SIGNIFICANT CHANGE UP (ref 34.5–46.5)
HGB BLD CALC-MCNC: 11.7 G/DL — SIGNIFICANT CHANGE UP (ref 11.5–15.5)
HGB BLD-MCNC: 11.1 G/DL — LOW (ref 11.5–15.5)
IANC: 5.82 K/UL — SIGNIFICANT CHANGE UP (ref 1.5–8.5)
IMM GRANULOCYTES NFR BLD AUTO: 0.3 % — SIGNIFICANT CHANGE UP (ref 0–1.5)
LACTATE BLDV-MCNC: 1 MMOL/L — SIGNIFICANT CHANGE UP (ref 0.5–2)
LYMPHOCYTES # BLD AUTO: 3.53 K/UL — HIGH (ref 1–3.3)
LYMPHOCYTES # BLD AUTO: 34.3 % — SIGNIFICANT CHANGE UP (ref 13–44)
MCHC RBC-ENTMCNC: 28.4 PG — SIGNIFICANT CHANGE UP (ref 27–34)
MCHC RBC-ENTMCNC: 32.2 GM/DL — SIGNIFICANT CHANGE UP (ref 32–36)
MCV RBC AUTO: 88.2 FL — SIGNIFICANT CHANGE UP (ref 80–100)
MONOCYTES # BLD AUTO: 0.69 K/UL — SIGNIFICANT CHANGE UP (ref 0–0.9)
MONOCYTES NFR BLD AUTO: 6.7 % — SIGNIFICANT CHANGE UP (ref 2–14)
NEUTROPHILS # BLD AUTO: 5.82 K/UL — SIGNIFICANT CHANGE UP (ref 1.8–7.4)
NEUTROPHILS NFR BLD AUTO: 56.4 % — SIGNIFICANT CHANGE UP (ref 43–77)
NRBC # BLD: 0 /100 WBCS — SIGNIFICANT CHANGE UP
NRBC # FLD: 0 K/UL — SIGNIFICANT CHANGE UP
PCO2 BLDV: 53 MMHG — HIGH (ref 39–42)
PH BLDV: 7.36 — SIGNIFICANT CHANGE UP (ref 7.32–7.43)
PLATELET # BLD AUTO: 394 K/UL — SIGNIFICANT CHANGE UP (ref 150–400)
PO2 BLDV: 28 MMHG — SIGNIFICANT CHANGE UP
POTASSIUM BLDV-SCNC: 4.5 MMOL/L — SIGNIFICANT CHANGE UP (ref 3.5–5.1)
POTASSIUM SERPL-MCNC: 4.4 MMOL/L — SIGNIFICANT CHANGE UP (ref 3.5–5.3)
POTASSIUM SERPL-SCNC: 4.4 MMOL/L — SIGNIFICANT CHANGE UP (ref 3.5–5.3)
PROT SERPL-MCNC: 7.6 G/DL — SIGNIFICANT CHANGE UP (ref 6–8.3)
RBC # BLD: 3.91 M/UL — SIGNIFICANT CHANGE UP (ref 3.8–5.2)
RBC # FLD: 13.5 % — SIGNIFICANT CHANGE UP (ref 10.3–14.5)
SAO2 % BLDV: 49.1 % — SIGNIFICANT CHANGE UP
SODIUM SERPL-SCNC: 139 MMOL/L — SIGNIFICANT CHANGE UP (ref 135–145)
WBC # BLD: 10.3 K/UL — SIGNIFICANT CHANGE UP (ref 3.8–10.5)
WBC # FLD AUTO: 10.3 K/UL — SIGNIFICANT CHANGE UP (ref 3.8–10.5)

## 2022-02-25 PROCEDURE — 76705 ECHO EXAM OF ABDOMEN: CPT | Mod: 26

## 2022-02-25 PROCEDURE — 99285 EMERGENCY DEPT VISIT HI MDM: CPT

## 2022-02-25 NOTE — ED PROVIDER NOTE - NSICDXPASTSURGICALHX_GEN_ALL_CORE_FT
PAST SURGICAL HISTORY:  h/o cholecystectomy  laproscopic silvia    History of  2016    History of D&C 2009       7

## 2022-02-25 NOTE — CONSULT NOTE ADULT - ASSESSMENT
Impression: Recurrent abdominal wall abscess  Plan: Incision and drainage.  Procedure: Under local anesthesia, the fluctuant area was infiltrated with lidocaine.  Needle aspiration obtained .5cc of thick purulent material.  A skin incision was made, approximately  4 cc. of purulent material mixed with blood were drained.  The abscess cavity was packed with 1/4" plain packing.  The patient will be followed as an outpatient.  Will discuss the findings and treatment with Dr. Dove.

## 2022-02-25 NOTE — ED PROVIDER NOTE - OBJECTIVE STATEMENT
31y F w/ PSHx  presents to the ED c/o abd pain and redness. Pt reports having a hematoma 2 wks ago on her  area and had it drained by IR and was given Flagyl and Cipro for 5 days, which relieved her sx. However pt felt red and hot two Sundays ago, and was advised to go to the ED. She had her CT done, which turned out she had another fluid collection c/f cellulitis, and was sent home on Doxycycline for 7 days, which she finished on the Wednesday and significantly improved her redness. However, pt reports recurrence of sx once she finished taking the abx. Denies fever, chills, N/V/D, or urinary sx, w/ the exception of minor drops of blood in her urine.   Dr. Clemente 31y F w/ PSHx  presents to the ED c/o lower abd pain and redness. Pt reports having a hematoma 2 wks ago on her  area and had it drained by IR and was given Flagyl and Cipro for 5 days, which relieved her sx. However pt felt red and hot two Sundays ago, and was advised to go to the ED. She had her CT done, which turned out she had another fluid collection c/f cellulitis, and was sent home on Doxycycline for 7 days, which she finished on Wednesday and significantly improved her redness. However, pt reports recurrence of sx once she finished taking the abx. Denies fever, chills, N/V/D, or urinary sx, w/ the exception of minor drops of blood in her urine. Dr. Clemente was her doctor. 31y F w/ PSHx  presents to the ED c/o lower abd pain and redness. Pt reports having a hematoma 2 wks ago on her  area and had it drained by IR and was given Flagyl and Cipro for 5 days, which relieved her sx. However pt felt red and hot two Sundays ago, and was advised to go to the ED. She had her CT done, which turned out she had another fluid collection c/f cellulitis, and was sent home on Doxycycline for 7 days, which she finished on Wednesday and significantly improved her redness. However, pt reports recurrence of sx once she finished taking the abx. Denies fever, chills, N/V/D, or urinary sx, w/ the exception of minor drops of blood in her urine. Dr. Clemente was her doctor. Pain localized to suprapubic region, sharp, and non radiating. No other current complaints. 31y F w/ PSHx  presents to the ED c/o lower abd pain and redness. Pt reports having a hematoma 2 wks ago around her  area and had it drained by IR and was given Flagyl and Cipro for 5 days, which relieved her sx. However pt felt red and hot two Sundays ago, and was advised to go to the ED. She had her CT done, which turned out she had another fluid collection c/f cellulitis, and was sent home on Doxycycline for 7 days, which she finished on Wednesday and significantly improved her redness. However, pt reports recurrence of sx once she finished taking the abx. Denies fever, chills, N/V/D, or urinary sx, w/ the exception of minor drops of blood in her urine. Dr. Keith is her doctor. Pain localized to suprapubic region, sharp, and non radiating. No other current complaints.

## 2022-02-25 NOTE — ED ADULT NURSE NOTE - OBJECTIVE STATEMENT
received pt in intake room 9, 31 yr/o female A+Ox4, ambulatory at baseline. presented to the ED C/O  site pain. pt states she gave birth in December and had frequent readmission for surgical site infection. as per pt she was sent home on PO antibiotics, she states symptoms returned yesterday evening. 8/10 aching stabbing pain. denies chest pain and SOB, RR even and unlabored. right AC 20g placed, labs drawn and sent. will continue to monitor.

## 2022-02-25 NOTE — ED PROVIDER NOTE - PHYSICAL EXAMINATION
GEN - NAD; well appearing; A&O x3   HEAD - NC/AT   EYES- PERRL, EOMI  ENT: Airway patent, mmm  PULMONARY - CTA b/l, symmetric breath sounds. No W/R/R.  CARDIAC -s1s2, RRR, no M,G,R, No JVD  ABDOMEN - +BS, ND, TTP over suprapubic lesion, soft, no guarding, no rebound, no masses , no rigidity  BACK - Normal  spine   EXTREMITIES - FROM, symmetric pulses, capillary refill < 2 seconds, no edema, 5/5 strength in b/l UE and LE  SKIN - horizontal suprapubic  scar with overlying firm lesion measuring 5cm x 4cm and irrecular shape, extending minimally below the scar. Overlying non-blanching erythema with central 1cm x 1cm fluctuance that as overlying ecchymosis.  NEUROLOGIC - alert, speech clear, no focal deficits, CN II-XII grossly intact, normal gait, sensation grossly intact  PSYCH -nl mood/affect, nl insight. GEN - NAD; well appearing; A&O x3   HEAD - NC/AT   EYES- PERRL, EOMI  ENT: Airway patent, mmm  PULMONARY - CTA b/l, symmetric breath sounds. No W/R/R.  CARDIAC -s1s2, RRR, no M,G,R, No JVD  ABDOMEN - +BS, ND, TTP over suprapubic region with erythema and warmth with central area of induration. soft, no guarding, no rebound, no masses , no rigidity  BACK - Normal  spine   EXTREMITIES - FROM, symmetric pulses, capillary refill < 2 seconds, no edema, 5/5 strength in b/l UE and LE  SKIN - horizontal suprapubic  scar with overlying firm lesion measuring 5cm x 4cm and irrecular shape, extending minimally below the scar. Overlying non-blanching erythema with central 1cm x 1cm fluctuance that as overlying ecchymosis.  NEUROLOGIC - alert, speech clear, no focal deficits, CN II-XII grossly intact, normal gait, sensation grossly intact  PSYCH -nl mood/affect, nl insight.

## 2022-02-25 NOTE — ED PROVIDER NOTE - ATTENDING CONTRIBUTION TO CARE
I have personally performed a history and physical examination of the patient and discussed management with the ACP as well as the patient.  I reviewed the ACP's note and agree with the documented findings and plan of care.  I have authored and modified critical sections of the Provider Note, including but not limited to HPI, Physical Exam and MDM.    31y F w/ PSHx  presents to the ED c/o lower abd pain and redness. Pt reports having a hematoma 2 wks ago on her  area and had it drained by IR and was given Flagyl and Cipro for 5 days, which relieved her sx. However pt felt red and hot two Sundays ago, and was advised to go to the ED. She had her CT done, which turned out she had another fluid collection c/f cellulitis, and was sent home on Doxycycline for 7 days, which she finished on Wednesday and significantly improved her redness. However, pt reports recurrence of sx once she finished taking the abx. Denies fever, chills, N/V/D, or urinary sx, w/ the exception of minor drops of blood in her urine. Dr. Clemente was her doctor. Pain localized to suprapubic region, sharp, and non radiating. No other current complaints.    Likely Cellulitis vs abscess. At length discussion held with the patient regarding CT scan for further evaluation. Pt verbalized risks of not undergoing CT, however declined due to multiple recent CT scans. Will obtain US for evaluation at this time. Obtain cbc, cmp, vbg with lactate to screen for infection.

## 2022-02-25 NOTE — ED ADULT NURSE NOTE - NSICDXPASTMEDICALHX_GEN_ALL_CORE_FT
[FreeTextEntry1] : Seen here for PAF and non ischemic CM \par Recent stress tetst LVEF41% with poor effrt tolerance and exaggerated BP respone \par Has taken up regular walking up to 3 miles on flat ground at brisk pace \par Compliant with BP and NOAC agents \par no orthopnea or pnd \par 2/24/2021\par Feells well with no effort intolerance no edema or PND \par no bleeding issues on NOAC agent 
PAST MEDICAL HISTORY:  Anemia B Twelve Deficiency     COVID-19 vaccine series completed     Gestational diabetes , diet controlled    Gestational hypertension , Not on any meds.    h/o Gall Bladder Disease     HELLP syndrome 2016    Maternal care for low transverse scar from previous  delivery     Menstrual Disorder

## 2022-02-25 NOTE — ED PROVIDER NOTE - CLINICAL SUMMARY MEDICAL DECISION MAKING FREE TEXT BOX
31y F w/ PSHx  presents to the ED c/o lower abd pain and redness. Pt reports having a hematoma 2 wks ago on her  area and had it drained by IR and was given Flagyl and Cipro for 5 days, which relieved her sx. However pt felt red and hot two Sundays ago, and was advised to go to the ED. She had her CT done, which turned out she had another fluid collection c/f cellulitis, and was sent home on Doxycycline for 7 days, which she finished on Wednesday and significantly improved her redness. However, pt reports recurrence of sx once she finished taking the abx. Denies fever, chills, N/V/D, or urinary sx, w/ the exception of minor drops of blood in her urine. Dr. Clemente was her doctor. Pain localized to suprapubic region, sharp, and non radiating. No other current complaints.    Likely Cellulitis vs abscess. At length discussion held with the patient regarding CT scan for further evaluation. Pt verbalized risks of not undergoing CT, however declined due to multiple recent CT scans. Will obtain US for evaluation at this time. Obtain cbc, cmp, vbg with lactate to screen for infection.

## 2022-02-25 NOTE — ED ADULT TRIAGE NOTE - CHIEF COMPLAINT QUOTE
Pt c/o lower abd pain  after having  10 weeks ago, pt states that she has had fluid collections drained x 2 and was placed on Doxy, finished course, still having pain.

## 2022-02-25 NOTE — ED PROVIDER NOTE - PATIENT PORTAL LINK FT
You can access the FollowMyHealth Patient Portal offered by [facility] by registering at the following website: [email or web address]. By joining [employer]’s FollowMyHealth portal, you will also be able to view your health information using other applications (apps) compatible with our system.

## 2022-02-25 NOTE — ED PROVIDER NOTE - NSFOLLOWUPINSTRUCTIONS_ED_ALL_ED_FT
You will receive a call on Monday 2/28/22 to make an appointment with .    Vicente Keith)  Surgery  [address] [place] [address], [address] [place] [address]  Phone: [phone]  Fax: [phone]    You may take Tylenol 500mg every 6 hours and Motrin 400mg every 4 hours for pain control. Please follow with your Primary care provider. You do not require antibiotics at this time.     Return to ED for any new or worsening symptoms including but not limited to: development of chest pain, shortness of breath, fever, vomiting, focal numbness, weakness or tingling, any severe CP, headache, abdominal pain, back pain.

## 2022-02-25 NOTE — CONSULT NOTE ADULT - SUBJECTIVE AND OBJECTIVE BOX
History obtained. The  patient was examined.  Developed an abdominal wall infection following a  that was initially treated with antibiotics. Underwent drainage of an infected hematoma by IR.  Subsequent to the removal  of the drain, developed pain. Underwent two CT Scans that  showed a phlegmon and then an abscess deemed to small to drain by IR. Was again placed on antibiotics with initial improvement until two days ago when  she developed increasing pain and erythema.  Physical Exam: Vital signs are stable.  Abdomen: Area of erythema measuring  approximately 6 cms long, with a fluctuant area in the center. History obtained. The  patient was examined.  Developed an abdominal wall infection following a  that was initially treated with antibiotics. Underwent drainage of an infected hematoma by IR.  Subsequent to the removal  of the drain, developed pain. Underwent two CT Scans that  showed a phlegmon and then an abscess deemed to small to drain by IR. Was again placed on antibiotics with initial improvement until two days ago when  she developed increasing pain and erythema.  Past Medical and Surgical Histories: As in the ER. note.  Allergies: As noted by the ER staff.  Physical Exam: Vital signs are stable.  Abdomen: Area of erythema measuring  approximately 6 cms long, with a fluctuant area in the center. GENERAL SURGERY CONSULT NOTE  --------------------------------------------------------------------------------------------  HPI:  History obtained. The  patient was examined.  Developed an abdominal wall infection following a  that was initially treated with antibiotics. Underwent drainage of an infected hematoma by IR.  Subsequent to the removal  of the drain, developed pain. Underwent two CT Scans that  showed a phlegmon and then an abscess deemed to small to drain by IR. Was again placed on antibiotics with initial improvement until two days ago when  she developed increasing pain and erythema.  Past Medical and Surgical Histories: As in the ER. note.  Allergies: As noted by the ER staff.      PAST MEDICAL & SURGICAL HISTORY:  Menstrual Disorder    Anemia B Twelve Deficiency    h/o Gall Bladder Disease    HELLP syndrome      COVID-19 vaccine series completed    Maternal care for low transverse scar from previous  delivery    Gestational diabetes  , diet controlled    Gestational hypertension  , Not on any meds.    h/o cholecystectomy   laproscopic silvia    History of   2016    History of D&amp;C        FAMILY HISTORY:  [] Family history not pertinent as reviewed with the patient and family    SOCIAL HISTORY:  ***    ALLERGIES: Benadryl (Anaphylaxis)  ceftriaxone (Hives (Mild to Mod))  Cipro (Diarrhea; Nausea)  erythromycin (Diarrhea; Nausea)  erythromycin (Unknown)  fish (Other)  penicillin (Unknown)      HOME MEDICATIONS: ***    CURRENT MEDICATIONS  MEDICATIONS (STANDING):   MEDICATIONS (PRN):  --------------------------------------------------------------------------------------------    Vitals:   T(C): 36.7 (22 @ 20:34), Max: 36.7 (22 @ 16:54)  HR: 86 (22 @ 20:34) (86 - 90)  BP: 129/87 (22 @ 20:34) (129/87 - 132/88)  RR: 16 (22 @ 20:34) (16 - 16)  SpO2: 100% (22 @ 20:34) (99% - 100%)  CAPILLARY BLOOD GLUCOSE          Height (cm): 152.4 ( 16:54)      Physical Exam: Vital signs are stable.  Abdomen: Area of erythema measuring  approximately 6 cms long, with a fluctuant area in the center.  --------------------------------------------------------------------------------------------    LABS  CBC (:)                              11.1<L>                         10.30   )----------------(  394        56.4  % Neutrophils, 34.3  % Lymphocytes, ANC: 5.82                                34.5      BMP (:)             139     |  102     |  8     		Ca++ --      Ca 9.1                ---------------------------------( 93    		Mg --                 4.4     |  27      |  0.58  			Ph --        LFTs (:)      TPro 7.6 / Alb 4.2 / TBili 0.2 / DBili -- / AST 11 / ALT 10 / AlkPhos 127<H>          VBG ( 19:22)     7.36 / 53<H> / 28 / 30<H> / 3.4<H> / 49.1%     Lactate: 1.0    --------------------------------------------------------------------------------------------    MICROBIOLOGY    -> .Blood Blood-Venous Culture ( @ 10:45)     NG    NG    No Growth Final    -> .Blood Blood Culture ( @ 10:30)     NG    NG    No Growth Final      --------------------------------------------------------------------------------------------    IMAGING  ***    --------------------------------------------------------------------------------------------    ASSESSMENT: Patient is a 31yf pmhx ***    PLAN:  ***  -   -   -   -   - Patient seen/examined with attending.  - Plan to be discussed with Attending,   GENERAL SURGERY CONSULT NOTE  --------------------------------------------------------------------------------------------  HPI:  History obtained. The  patient was examined.  Developed an abdominal wall infection following a  that was initially treated with antibiotics. Underwent drainage of an infected hematoma by IR.  Subsequent to the removal  of the drain, developed pain. Underwent two CT Scans that  showed a phlegmon and then an abscess deemed to small to drain by IR. Was again placed on antibiotics with initial improvement until two days ago when  she developed increasing pain and erythema.  Past Medical and Surgical Histories: As in the ER. note.  Allergies: As noted by the ER staff.      PAST MEDICAL & SURGICAL HISTORY:  Menstrual Disorder    Anemia B Twelve Deficiency    h/o Gall Bladder Disease    HELLP syndrome      COVID-19 vaccine series completed    Maternal care for low transverse scar from previous  delivery    Gestational diabetes  , diet controlled    Gestational hypertension  , Not on any meds.    h/o cholecystectomy   laproscopic silvia    History of   2016    History of D&amp;C        FAMILY HISTORY:  [] Family history not pertinent as reviewed with the patient and family    SOCIAL HISTORY:  ***    ALLERGIES: Benadryl (Anaphylaxis)  ceftriaxone (Hives (Mild to Mod))  Cipro (Diarrhea; Nausea)  erythromycin (Diarrhea; Nausea)  erythromycin (Unknown)  fish (Other)  penicillin (Unknown)      HOME MEDICATIONS: ***    CURRENT MEDICATIONS  MEDICATIONS (STANDING):   MEDICATIONS (PRN):  --------------------------------------------------------------------------------------------    Vitals:   T(C): 36.7 (22 @ 20:34), Max: 36.7 (22 @ 16:54)  HR: 86 (22 @ 20:34) (86 - 90)  BP: 129/87 (22 @ 20:34) (129/87 - 132/88)  RR: 16 (22 @ 20:34) (16 - 16)  SpO2: 100% (22 @ 20:34) (99% - 100%)  CAPILLARY BLOOD GLUCOSE          Height (cm): 152.4 ( 16:54)      Physical Exam: Vital signs are stable.  Abdomen: Area of erythema measuring  approximately 6 cms long, with a fluctuant area in the center.  --------------------------------------------------------------------------------------------    LABS  CBC (:)                              11.1<L>                         10.30   )----------------(  394        56.4  % Neutrophils, 34.3  % Lymphocytes, ANC: 5.82                                34.5      BMP (:)             139     |  102     |  8     		Ca++ --      Ca 9.1                ---------------------------------( 93    		Mg --                 4.4     |  27      |  0.58  			Ph --        LFTs (:)      TPro 7.6 / Alb 4.2 / TBili 0.2 / DBili -- / AST 11 / ALT 10 / AlkPhos 127<H>          VBG ( 19:22)     7.36 / 53<H> / 28 / 30<H> / 3.4<H> / 49.1%     Lactate: 1.0    --------------------------------------------------------------------------------------------    MICROBIOLOGY    -> .Blood Blood-Venous Culture ( @ 10:45)     NG    NG    No Growth Final    -> .Blood Blood Culture ( @ 10:30)     NG    NG    No Growth Final      --------------------------------------------------------------------------------------------    IMAGING  ***    --------------------------------------------------------------------------------------------

## 2022-03-09 LAB
CULTURE RESULTS: SIGNIFICANT CHANGE UP
SPECIMEN SOURCE: SIGNIFICANT CHANGE UP

## 2022-09-21 NOTE — H&P PST ADULT - MEDICATION HERBAL REMEDIES, PROFILE
[employer] Pregnancy And Lactation Text: This medication is Pregnancy Category B and is considered safe. There is some data to suggest avoiding during the third trimester. It is unknown if this medication is excreted in breast milk. no

## 2022-10-26 NOTE — H&P PST ADULT - GASTROINTESTINAL DETAILS
Pre-Procedure Local Anesthetic Only     Procedure date: 10/26/2022    Planned Procedure:  Procedure: Medial Branch/Dorsal Ramus Block, Bilateral, Lumbar L3, L4, and L5, First Diagnostic Block       Informed Consent was obtained, patient or responsible party denies additional questions regarding procedure.    Visit Vitals  BP (!) 142/76 (BP Location: LUE - Left upper extremity, Patient Position: Sitting)   Pulse 60   Temp 97 °F (36.1 °C) (Temporal)   Resp 16   Ht 5' 6\" (1.676 m)   Wt 106.6 kg (235 lb)   SpO2 97%   BMI 37.93 kg/m²     Constitutional: No acute distress       OTHER FINDINGS:  • Patient asked to hold any blood thinning medications for procedure: No, okay to continue taking per proceduralist   • History of any bleeding disorders: none    • Reviewed pertinent lab/diagnostic tests:   none           Assessing Provider: HEIDY Cerna                         Time: 9:04 AM      
no masses palpable/no distention/soft/bowel sounds normal/nontender

## 2023-01-12 NOTE — ED ADULT NURSE NOTE - NSICDXPASTMEDICALHX_GEN_ALL_CORE_FT
Chief Complaint   Patient presents with    Otalgia    Chest Congestion         6 m.o. female presenting to clinic for  Otalgia and Chest Congestion     HPI    Some congestion and cough  for about 5-6 days  Had fever last week for 4 days (subjective)  , tactile, started the day after received vaccine.  No tactile fever past 2 days.   Very mucousy , noisy breathing that gets better after using humidifier, suctioning.    Check ears - some decrease in sleep two nights this week.   Taking feedings well.     Review of patient's allergies indicates:  No Known Allergies    No current outpatient medications on file prior to visit.     No current facility-administered medications on file prior to visit.       History reviewed. No pertinent past medical history.   History reviewed. No pertinent surgical history.    Social History     Tobacco Use    Smoking status: Never     Passive exposure: Yes    Smokeless tobacco: Current        Family History   Problem Relation Age of Onset    No Known Problems Mother     No Known Problems Father     No Known Problems Maternal Grandmother     No Known Problems Maternal Grandfather     No Known Problems Paternal Grandmother     No Known Problems Paternal Grandfather         Review of Systems     Temp 97.5 °F (36.4 °C)   Resp 30   Wt 6.56 kg (14 lb 7.4 oz)   BMI 14.76 kg/m²     Physical Exam  Constitutional:       General: She is active. She is not in acute distress.     Appearance: She is not toxic-appearing.   HENT:      Head: Normocephalic and atraumatic. Anterior fontanelle is flat.      Comments: smiling     Right Ear: Tympanic membrane is erythematous and bulging.      Left Ear: Tympanic membrane is erythematous and bulging.      Nose: Congestion and rhinorrhea present.      Mouth/Throat:      Mouth: Mucous membranes are moist.   Eyes:      General:         Right eye: No discharge.         Left eye: No discharge.      Conjunctiva/sclera: Conjunctivae normal.      Pupils: Pupils are  equal, round, and reactive to light.   Cardiovascular:      Rate and Rhythm: Regular rhythm.      Heart sounds: No murmur heard.  Pulmonary:      Effort: Pulmonary effort is normal. No retractions.      Breath sounds: Normal breath sounds. No wheezing.   Abdominal:      General: Abdomen is flat. Bowel sounds are normal.      Tenderness: There is no abdominal tenderness.   Musculoskeletal:      Cervical back: Normal range of motion.   Skin:     General: Skin is warm.      Capillary Refill: Capillary refill takes less than 2 seconds.   Neurological:      General: No focal deficit present.      Mental Status: She is alert.      Motor: No abnormal muscle tone.          Assessment and Plan (Medical Justification)      Ainsley was seen today for otalgia and chest congestion.    Diagnoses and all orders for this visit:    Acute suppurative otitis media of both ears without spontaneous rupture of tympanic membranes, recurrence not specified  -     Discontinue: amoxicillin-clavulanate (AUGMENTIN) 400-57 mg/5 mL SusR; Take 3.3 mLs (264 mg total) by mouth 2 (two) times daily. for 10 days  -     amoxicillin-clavulanate (AUGMENTIN) 600-42.9 mg/5 mL SusR; Take 2.2 mLs (264 mg total) by mouth every 12 (twelve) hours. for 10 days    Upper respiratory infection, acute    Cough, unspecified type         No follow-ups on file.     Saline nose drops, humidifer.  Suctioning.  Started augmentin .    Call if develops shortness of breath, not taking fluids, other concerns.     Total time spent:  30 min  This includes face to face time and non-face to face time preparing to see the patient (eg, review of tests), Obtaining and/or reviewing separately obtained history, Documenting clinical information in the electronic or other health record, Independently interpreting results and communicating results to the patient/family/caregiver, or Care coordination.  Done on day of visit    Available Notes, Procedures and Results, including  Labs/Imaging, from the last 3 months were reviewed.    Risks, benefits, and side effects were discussed with the patient. All questions were answered to the fullest satisfaction of the patient, and pt verbalized understanding and agreement to treatment plan. Pt was to call with any new or worsening symptoms, or present to the ER.    Patient instructed that best way to communicate with my office staff is for patient to get on the [facility] patient portal to expedite communication and communication issues that may occur.  Patient was given instructions on how to get on the portal.  I encouraged patient to obtain portal access as well.  Ultimately it is up to the patient to obtain access.  Patient voiced understanding.           PAST MEDICAL HISTORY:  Anemia B Twelve Deficiency     COVID-19 vaccine series completed     h/o Gall Bladder Disease     HELLP syndrome 2016    Menstrual Disorder

## 2024-02-08 NOTE — H&P PST ADULT - LYMPH NODES
Labs per orders  Discussed safe sexual practices and condom use every encounter  Discussed need for pap   
Z pack  Medrol dose pack  Take zyrtec over the counter.  Use saline nose sprays  Cool mist humidifier with distilled water  Warm salt water gargles  Warm tea with honey and lemon  Chloraseptic spray OTC.   Tylenol/ibuprofen for pain/fever greater than 100.4  Increase water intake.   Return to clinic if there is no improvement.     
Case coordinator will be notified when available
No lymphadedenopathy

## 2024-04-25 ENCOUNTER — APPOINTMENT (OUTPATIENT)
Dept: ULTRASOUND IMAGING | Facility: CLINIC | Age: 34
End: 2024-04-25
Payer: COMMERCIAL

## 2024-04-25 ENCOUNTER — OUTPATIENT (OUTPATIENT)
Dept: OUTPATIENT SERVICES | Facility: HOSPITAL | Age: 34
LOS: 1 days | End: 2024-04-25
Payer: COMMERCIAL

## 2024-04-25 DIAGNOSIS — Z98.891 HISTORY OF UTERINE SCAR FROM PREVIOUS SURGERY: Chronic | ICD-10-CM

## 2024-04-25 DIAGNOSIS — Z00.8 ENCOUNTER FOR OTHER GENERAL EXAMINATION: ICD-10-CM

## 2024-04-25 DIAGNOSIS — Z98.890 OTHER SPECIFIED POSTPROCEDURAL STATES: Chronic | ICD-10-CM

## 2024-04-25 PROCEDURE — 76830 TRANSVAGINAL US NON-OB: CPT | Mod: 26

## 2024-04-25 PROCEDURE — 76830 TRANSVAGINAL US NON-OB: CPT

## 2024-08-30 NOTE — ED ADULT NURSE NOTE - CAS TRG GENERAL NORM CIRC DET
Counseling and Referral of Other Preventative  (Italic type indicates deductible and co-insurance are waived)    Patient Name: Mirza Do  Today's Date: 8/30/2024    Health Maintenance         Date Due Completion Date    Foot Exam 03/24/2021 3/24/2020 (Done)    Override on 3/24/2020: Done    COVID-19 Vaccine (7 - 2023-24 season) 09/01/2023 1/30/2023    Eye Exam 04/24/2024 4/24/2023    Override on 11/10/2020: Done    RSV Vaccine (Age 60+ and Pregnant patients) (1 - 1-dose 60+ series) 08/30/2024 (Originally 8/29/2013) ---    Pneumococcal Vaccines (Age 65+) (3 of 3 - PPSV23 or PCV20) 08/30/2025 (Originally 6/29/2022) 6/27/2019    Influenza Vaccine (1) 09/01/2024 10/15/2019    Override on 10/1/2017: Done    Override on 10/4/2016: Done    Override on 11/13/2015: Done    Hemoglobin A1c 12/07/2024 6/7/2024    PROSTATE-SPECIFIC ANTIGEN 06/07/2025 6/7/2024    High Dose Statin 06/07/2025 6/7/2024    Diabetes Urine Screening 06/07/2025 6/7/2024    Lipid Panel 06/07/2025 6/7/2024    Colorectal Cancer Screening 08/04/2027 8/4/2017    TETANUS VACCINE 06/27/2029 6/27/2019          Orders Placed This Encounter   Procedures    Ambulatory referral/consult to Podiatry       The following information is provided to all patients.  This information is to help you find resources for any of the problems found today that may be affecting your health:                  Living healthy guide: www.[facility].[place].gov      Understanding Diabetes: www.diabetes.org      Eating healthy: www.cdc.gov/healthyweight      CDC home safety checklist: www.cdc.gov/steadi/patient.html      Agency on Aging: www.goea.[place].gov      Alcoholics anonymous (AA): www.aa.org      Physical Activity: www.erin.nih.gov/hw4yolj      Tobacco use: www.quitwithusla.org          Strong peripheral pulses

## 2025-02-11 NOTE — OB PROVIDER TRIAGE NOTE - NS_PRENATALRECORD_OBGYN_ALL_OB
Go ahead and ask him to submit a sample of his urin. Maybe his dad can just drop it off here or at the lab  
Please advise?   
Yes, Full Record

## 2025-02-19 NOTE — ED PROVIDER NOTE - IV ALTEPLASE ADMIN OUTSIDE HIDDEN
PROCEDURES:  Reconstructive procedure using flap from anterolateral thigh 19-Feb-2025 12:37:23  Kyra Merino   show

## 2025-04-17 ENCOUNTER — APPOINTMENT (OUTPATIENT)
Dept: OBGYN | Facility: CLINIC | Age: 35
End: 2025-04-17
Payer: COMMERCIAL

## 2025-04-17 PROCEDURE — 81002 URINALYSIS NONAUTO W/O SCOPE: CPT

## 2025-04-17 PROCEDURE — 99202 OFFICE O/P NEW SF 15 MIN: CPT

## 2025-04-17 PROCEDURE — 87210 SMEAR WET MOUNT SALINE/INK: CPT | Mod: QW
